# Patient Record
Sex: MALE | Race: WHITE | Employment: OTHER | URBAN - METROPOLITAN AREA
[De-identification: names, ages, dates, MRNs, and addresses within clinical notes are randomized per-mention and may not be internally consistent; named-entity substitution may affect disease eponyms.]

---

## 2022-06-28 ENCOUNTER — TELEPHONE (OUTPATIENT)
Dept: ORTHOPEDIC SURGERY | Age: 72
End: 2022-06-28

## 2022-06-28 DIAGNOSIS — Z01.818 PREOPERATIVE TESTING: Primary | ICD-10-CM

## 2022-06-28 DIAGNOSIS — M16.12 PRIMARY OSTEOARTHRITIS OF LEFT HIP: ICD-10-CM

## 2022-06-28 NOTE — TELEPHONE ENCOUNTER
Orthopedic Nurse Navigator Summary    Patient Name:  Xavier Wilson  Anticipated Date of Surgery:  07/06/22  Attended Pre-op Education Class:  Video sent to patient email  PCP: Clare De La Paz CNP  Date of PCP visit for H&P: 06/23/22  Is patient in a Pain Management program:  Review of Medical history reveals history of: HTN, Hyperlipidemia, GERD, Non rheumatic aortic insufficiency, Chronic LAFB, Hearing loss, Back fusion- L5-S1 last year    Critical Lab Values  - Hemoglobin (g/dL):  Date: 06/29/22 Value 14.4  - Hematocrit(%): Date: 06/29/22  Value 43.4  - HgbA1C:  Date: 06/29/22 Value 6.1  - Albumin:  Date: 06/29/22   Value 4.5  - BUN:  Date:  06/29/22 Value 19  - Creatinine:  Date: 06/29/22  Value 1.13     06/28/22- MRSA swab- negative. Swab was positive for Staph Aureus    Coronary Artery Disease/HTN/CHF history: Yes  Cardiologist: Karma Martins CNP  Cardiac clearance necessary:  Yes  Date of cardiac clearance appt: 06/15/22  Final Cardiac recommendations: On any anticoagulation:    Diabetes History:  No  Most recent HgbA1C:  Pulmonary:  COPD/Emphysema/Use of home oxygen: No  Alcohol use: Yes    BMI greater than 40 at time of scheduling: Additional medical concerns:   Additional recommendations for above concerns:  Attended Pre-Hab program:    Anticipated Discharge Disposition:  Home with OPT  Who will be with patient at home following discharge:  Daughter, wife  Equipment patient already has:  Butch Opal, cane, crutches  Bedroom on first or second floor:  Second- he plans on sleeping on first floor in recliner  Bathroom on first or second floor:  both  Weight bearing status:  wbat  Pre-op ambulatory status: painful ambulation  Number of entry steps:  3  Caregiver assistance:  Full time    Sondra Johnson RN  Date:  06/27/22

## 2022-06-30 RX ORDER — ESCITALOPRAM OXALATE 10 MG/1
TABLET ORAL
COMMUNITY
Start: 2022-04-12

## 2022-06-30 RX ORDER — ATORVASTATIN CALCIUM 10 MG/1
TABLET, FILM COATED ORAL
COMMUNITY
Start: 2022-03-31

## 2022-06-30 RX ORDER — VALACYCLOVIR HYDROCHLORIDE 1 G/1
2000 TABLET, FILM COATED ORAL
COMMUNITY

## 2022-06-30 RX ORDER — FLUTICASONE PROPIONATE 50 MCG
1 SPRAY, SUSPENSION (ML) NASAL
COMMUNITY

## 2022-06-30 RX ORDER — GABAPENTIN 300 MG/1
300 CAPSULE ORAL 3 TIMES DAILY
COMMUNITY
Start: 2022-06-14

## 2022-06-30 RX ORDER — MELOXICAM 15 MG/1
TABLET ORAL
COMMUNITY
Start: 2022-06-28

## 2022-06-30 RX ORDER — METHYLPREDNISOLONE 4 MG/1
TABLET ORAL
COMMUNITY
Start: 2022-06-13

## 2022-06-30 RX ORDER — TRIAMCINOLONE ACETONIDE 1 MG/G
CREAM TOPICAL AS NEEDED
COMMUNITY
Start: 2022-05-06

## 2022-06-30 RX ORDER — AMOXICILLIN AND CLAVULANATE POTASSIUM 875; 125 MG/1; MG/1
TABLET, FILM COATED ORAL
COMMUNITY
Start: 2022-06-09

## 2022-06-30 NOTE — PROGRESS NOTES
Place patient label inside box (if no patient label, complete below)  Name:  :  MR#:   Davida Pierre / PROCEDURE  1. I (we), Ritika Bartholomew (Patient Name) authorize DR Bhavesh Conley (Provider / Cecile Ospina) and/or such assistants as may be selected by him/her, to perform the following operation/procedure(s): LEFT TOTAL HIP ARTHROPLASTY ANTERIOR APPROACH        Note: If unable to obtain consent prior to an emergent procedure, document the emergent reason in the medical record. This procedure has been explained to my (our) satisfaction and included in the explanation was:  A) The intended benefit, nature, and extent of the procedure to be performed;  B) The significant risks involved and the probability of success;  C) Alternative procedures and methods of treatment;  D) The dangers and probable consequences of such alternatives (including no procedure or treatment); E) The expected consequences of the procedure on my future health;  F) Whether other qualified individuals would be performing important surgical tasks and/or whether  would be present to advise or support the procedure. I (we) understand that there are other risks of infection and other serious complications in the pre-operative/procedural and postoperative/procedural stages of my (our) care. I (we) have asked all of the questions which I (we) thought were important in deciding whether or not to undergo treatment or diagnosis. These questions have been answered to my (our) satisfaction. I (we) understand that no assurance can be given that the procedure will be a success, and no guarantee or warranty of success has been given to me (us).     2. It has been explained to me (us) that during the course of the operation/procedure, unforeseen conditions may be revealed that necessitate extension of the original procedure(s) or different procedure(s) than those set forth in Paragraph 1. I (we) authorize and request that the above-named physician, his/her assistants or his/her designees, perform procedures as necessary and desirable if deemed to be in my (our) best interest.     Revised 8/2/2021                                                                          Page 1 of 2       3. I acknowledge that health care personnel may be observing this procedure for the purpose of medical education or other specified purposes as may be necessary as requested and/or approved by my (our) physician. 4. I (we) consent to the disposal by the hospital Pathologist of the removed tissue, parts or organs in accordance with hospital policy. 5. I do ____ do not ____ consent to the use of a local infiltration pain blocking agent that will be used by my provider/surgical provider to help alleviate pain during my procedure. 6. I do ____ do not ____ consent to an emergent blood transfusion in the case of a life-threatening situation that requires blood components to be administered. This consent is valid for 24 hours from the beginning of the procedure. 7. This patient does ____ or does not ____ currently have a DNR status/order. If DNR order is in place, obtain Addendum to the Surgical Consent for ALL Patients with a DNR Order to address tara-operative status for limited intervention or DNR suspension.      8. I have read and fully understand the above Consent for Operation/Procedure and that all blanks were completed before I signed the consent.   _____________________________       _____________________      ____/____am/pm  Signature of Patient or legal representative      Printed Name / Relationship            Date / Time   ____________________________       _____________________      ____/____am/pm  Witness to Signature                                    Printed Name                    Date / Time     If patient is unable to sign or is a minor, complete the following)  Patient is a minor, ____ years of age, or unable to sign because:   ______________________________________________________________________________________________    Maggie Soto If a phone consent is obtained, consent will be documented by using two health care professionals, each affirming that the consenting party has no questions and gives consent for the procedure discussed with the physician/provider.   _____________________          ____________________       _____/_____am/pm   2nd witness to phone consent        Printed name           Date / Time    Informed Consent:  I have provided the explanation described above in section 1 to the patient and/or legal representative.  I have provided the patient and/or legal representative with an opportunity to ask any questions about the proposed operation/procedure.   ___________________________          ____________________         ____/____am/pm  Provider / Proceduralist                            Printed name            Date / Time  Revised 8/2/2021                                                                      Page 2 of 2

## 2022-07-05 ENCOUNTER — ANESTHESIA EVENT (OUTPATIENT)
Dept: OPERATING ROOM | Age: 72
End: 2022-07-05
Payer: MEDICARE

## 2022-07-06 ENCOUNTER — APPOINTMENT (OUTPATIENT)
Dept: GENERAL RADIOLOGY | Age: 72
End: 2022-07-06
Attending: ORTHOPAEDIC SURGERY
Payer: MEDICARE

## 2022-07-06 ENCOUNTER — HOSPITAL ENCOUNTER (OUTPATIENT)
Age: 72
Setting detail: OBSERVATION
Discharge: HOME | End: 2022-07-07
Attending: ORTHOPAEDIC SURGERY | Admitting: ORTHOPAEDIC SURGERY
Payer: MEDICARE

## 2022-07-06 ENCOUNTER — ANESTHESIA (OUTPATIENT)
Dept: OPERATING ROOM | Age: 72
End: 2022-07-06
Payer: MEDICARE

## 2022-07-06 DIAGNOSIS — M16.12 PRIMARY OSTEOARTHRITIS OF LEFT HIP: Primary | ICD-10-CM

## 2022-07-06 PROCEDURE — 2580000003 HC RX 258: Performed by: ORTHOPAEDIC SURGERY

## 2022-07-06 PROCEDURE — 7100000000 HC PACU RECOVERY - FIRST 15 MIN: Performed by: ORTHOPAEDIC SURGERY

## 2022-07-06 PROCEDURE — C1776 JOINT DEVICE (IMPLANTABLE): HCPCS | Performed by: ORTHOPAEDIC SURGERY

## 2022-07-06 PROCEDURE — 6360000002 HC RX W HCPCS: Performed by: NURSE ANESTHETIST, CERTIFIED REGISTERED

## 2022-07-06 PROCEDURE — 2580000003 HC RX 258: Performed by: ANESTHESIOLOGY

## 2022-07-06 PROCEDURE — 3209999900 FLUORO FOR SURGICAL PROCEDURES

## 2022-07-06 PROCEDURE — 3600000004 HC SURGERY LEVEL 4 BASE: Performed by: ORTHOPAEDIC SURGERY

## 2022-07-06 PROCEDURE — 2700000000 HC OXYGEN THERAPY PER DAY

## 2022-07-06 PROCEDURE — 6370000000 HC RX 637 (ALT 250 FOR IP): Performed by: ORTHOPAEDIC SURGERY

## 2022-07-06 PROCEDURE — 6360000002 HC RX W HCPCS: Performed by: ORTHOPAEDIC SURGERY

## 2022-07-06 PROCEDURE — 6360000002 HC RX W HCPCS: Performed by: FAMILY MEDICINE

## 2022-07-06 PROCEDURE — 97530 THERAPEUTIC ACTIVITIES: CPT

## 2022-07-06 PROCEDURE — 97535 SELF CARE MNGMENT TRAINING: CPT

## 2022-07-06 PROCEDURE — 97166 OT EVAL MOD COMPLEX 45 MIN: CPT

## 2022-07-06 PROCEDURE — 97116 GAIT TRAINING THERAPY: CPT

## 2022-07-06 PROCEDURE — 6370000000 HC RX 637 (ALT 250 FOR IP): Performed by: FAMILY MEDICINE

## 2022-07-06 PROCEDURE — 94761 N-INVAS EAR/PLS OXIMETRY MLT: CPT

## 2022-07-06 PROCEDURE — G0378 HOSPITAL OBSERVATION PER HR: HCPCS

## 2022-07-06 PROCEDURE — 3700000000 HC ANESTHESIA ATTENDED CARE: Performed by: ORTHOPAEDIC SURGERY

## 2022-07-06 PROCEDURE — 2500000003 HC RX 250 WO HCPCS: Performed by: NURSE ANESTHETIST, CERTIFIED REGISTERED

## 2022-07-06 PROCEDURE — 3600000014 HC SURGERY LEVEL 4 ADDTL 15MIN: Performed by: ORTHOPAEDIC SURGERY

## 2022-07-06 PROCEDURE — 7100000001 HC PACU RECOVERY - ADDTL 15 MIN: Performed by: ORTHOPAEDIC SURGERY

## 2022-07-06 PROCEDURE — 3700000001 HC ADD 15 MINUTES (ANESTHESIA): Performed by: ORTHOPAEDIC SURGERY

## 2022-07-06 PROCEDURE — 73501 X-RAY EXAM HIP UNI 1 VIEW: CPT

## 2022-07-06 PROCEDURE — 2720000010 HC SURG SUPPLY STERILE: Performed by: ORTHOPAEDIC SURGERY

## 2022-07-06 PROCEDURE — 2709999900 HC NON-CHARGEABLE SUPPLY: Performed by: ORTHOPAEDIC SURGERY

## 2022-07-06 PROCEDURE — 97163 PT EVAL HIGH COMPLEX 45 MIN: CPT

## 2022-07-06 PROCEDURE — 2580000003 HC RX 258: Performed by: NURSE ANESTHETIST, CERTIFIED REGISTERED

## 2022-07-06 PROCEDURE — 2500000003 HC RX 250 WO HCPCS: Performed by: ORTHOPAEDIC SURGERY

## 2022-07-06 PROCEDURE — 73502 X-RAY EXAM HIP UNI 2-3 VIEWS: CPT

## 2022-07-06 DEVICE — SHELL ACET SZ F DIA56MM 5 CLUS H TRITANIUM PRESSFIT PRI: Type: IMPLANTABLE DEVICE | Site: HIP | Status: FUNCTIONAL

## 2022-07-06 DEVICE — SCREW BNE L25MM DIA6.5MM HEX LO PROF TRIDENT II: Type: IMPLANTABLE DEVICE | Site: HIP | Status: FUNCTIONAL

## 2022-07-06 DEVICE — HIP H2 TOT ADV OTHER HD IMPL CAPPED SYNTHES: Type: IMPLANTABLE DEVICE | Site: HIP | Status: FUNCTIONAL

## 2022-07-06 DEVICE — HEAD FEM DIA36MM +0MM OFFSET HIP BIOLOX DELT CERAMIC TAPR: Type: IMPLANTABLE DEVICE | Site: HIP | Status: FUNCTIONAL

## 2022-07-06 DEVICE — INSERT ACET F 0 DEG 36 MM HIP X3 TRIDENT: Type: IMPLANTABLE DEVICE | Site: HIP | Status: FUNCTIONAL

## 2022-07-06 RX ORDER — LABETALOL HYDROCHLORIDE 5 MG/ML
10 INJECTION, SOLUTION INTRAVENOUS
Status: DISCONTINUED | OUTPATIENT
Start: 2022-07-06 | End: 2022-07-06 | Stop reason: HOSPADM

## 2022-07-06 RX ORDER — PROPOFOL 10 MG/ML
INJECTION, EMULSION INTRAVENOUS PRN
Status: DISCONTINUED | OUTPATIENT
Start: 2022-07-06 | End: 2022-07-06 | Stop reason: SDUPTHER

## 2022-07-06 RX ORDER — PHENYLEPHRINE HYDROCHLORIDE 10 MG/ML
INJECTION INTRAVENOUS PRN
Status: DISCONTINUED | OUTPATIENT
Start: 2022-07-06 | End: 2022-07-06 | Stop reason: SDUPTHER

## 2022-07-06 RX ORDER — ONDANSETRON 2 MG/ML
4 INJECTION INTRAMUSCULAR; INTRAVENOUS EVERY 6 HOURS PRN
Status: DISCONTINUED | OUTPATIENT
Start: 2022-07-06 | End: 2022-07-07 | Stop reason: HOSPADM

## 2022-07-06 RX ORDER — PREDNISONE 1 MG/1
5 TABLET ORAL DAILY
Qty: 14 TABLET | Refills: 0 | Status: SHIPPED | OUTPATIENT
Start: 2022-07-06 | End: 2022-07-20

## 2022-07-06 RX ORDER — ASPIRIN 81 MG/1
81 TABLET ORAL 2 TIMES DAILY
Qty: 60 TABLET | Refills: 0 | Status: SHIPPED | OUTPATIENT
Start: 2022-07-06

## 2022-07-06 RX ORDER — OXYCODONE HYDROCHLORIDE 5 MG/1
10 TABLET ORAL PRN
Status: COMPLETED | OUTPATIENT
Start: 2022-07-06 | End: 2022-07-06

## 2022-07-06 RX ORDER — LISINOPRIL 40 MG/1
40 TABLET ORAL DAILY
Status: DISCONTINUED | OUTPATIENT
Start: 2022-07-07 | End: 2022-07-07 | Stop reason: HOSPADM

## 2022-07-06 RX ORDER — VANCOMYCIN HYDROCHLORIDE 1 G/20ML
INJECTION, POWDER, LYOPHILIZED, FOR SOLUTION INTRAVENOUS PRN
Status: DISCONTINUED | OUTPATIENT
Start: 2022-07-06 | End: 2022-07-06 | Stop reason: HOSPADM

## 2022-07-06 RX ORDER — OXYCODONE HYDROCHLORIDE 5 MG/1
5 TABLET ORAL EVERY 6 HOURS PRN
Qty: 28 TABLET | Refills: 0 | Status: SHIPPED | OUTPATIENT
Start: 2022-07-06 | End: 2022-07-13

## 2022-07-06 RX ORDER — ONDANSETRON 4 MG/1
4 TABLET, ORALLY DISINTEGRATING ORAL EVERY 8 HOURS PRN
Status: DISCONTINUED | OUTPATIENT
Start: 2022-07-06 | End: 2022-07-07 | Stop reason: HOSPADM

## 2022-07-06 RX ORDER — ESCITALOPRAM OXALATE 10 MG/1
10 TABLET ORAL DAILY
Status: DISCONTINUED | OUTPATIENT
Start: 2022-07-06 | End: 2022-07-07 | Stop reason: HOSPADM

## 2022-07-06 RX ORDER — MELOXICAM 7.5 MG/1
3.75 TABLET ORAL DAILY PRN
Status: DISCONTINUED | OUTPATIENT
Start: 2022-07-06 | End: 2022-07-07 | Stop reason: HOSPADM

## 2022-07-06 RX ORDER — OXYCODONE HYDROCHLORIDE 5 MG/1
5 TABLET ORAL
Status: DISCONTINUED | OUTPATIENT
Start: 2022-07-06 | End: 2022-07-07 | Stop reason: HOSPADM

## 2022-07-06 RX ORDER — ACETAMINOPHEN 500 MG
1000 TABLET ORAL ONCE
Status: COMPLETED | OUTPATIENT
Start: 2022-07-06 | End: 2022-07-06

## 2022-07-06 RX ORDER — SODIUM CHLORIDE 9 MG/ML
25 INJECTION, SOLUTION INTRAVENOUS PRN
Status: DISCONTINUED | OUTPATIENT
Start: 2022-07-06 | End: 2022-07-06 | Stop reason: HOSPADM

## 2022-07-06 RX ORDER — SODIUM CHLORIDE, SODIUM LACTATE, POTASSIUM CHLORIDE, CALCIUM CHLORIDE 600; 310; 30; 20 MG/100ML; MG/100ML; MG/100ML; MG/100ML
INJECTION, SOLUTION INTRAVENOUS CONTINUOUS
Status: DISCONTINUED | OUTPATIENT
Start: 2022-07-06 | End: 2022-07-06 | Stop reason: HOSPADM

## 2022-07-06 RX ORDER — SODIUM CHLORIDE 0.9 % (FLUSH) 0.9 %
5-40 SYRINGE (ML) INJECTION EVERY 12 HOURS SCHEDULED
Status: DISCONTINUED | OUTPATIENT
Start: 2022-07-06 | End: 2022-07-06 | Stop reason: HOSPADM

## 2022-07-06 RX ORDER — NALBUPHINE HCL 10 MG/ML
5 AMPUL (ML) INJECTION EVERY 5 MIN PRN
Status: DISCONTINUED | OUTPATIENT
Start: 2022-07-06 | End: 2022-07-07 | Stop reason: HOSPADM

## 2022-07-06 RX ORDER — DEXAMETHASONE SODIUM PHOSPHATE 4 MG/ML
4 INJECTION, SOLUTION INTRA-ARTICULAR; INTRALESIONAL; INTRAMUSCULAR; INTRAVENOUS; SOFT TISSUE EVERY 12 HOURS
Status: COMPLETED | OUTPATIENT
Start: 2022-07-06 | End: 2022-07-07

## 2022-07-06 RX ORDER — POLYETHYLENE GLYCOL 3350 17 G/17G
17 POWDER, FOR SOLUTION ORAL DAILY PRN
Status: DISCONTINUED | OUTPATIENT
Start: 2022-07-06 | End: 2022-07-07 | Stop reason: HOSPADM

## 2022-07-06 RX ORDER — OXYCODONE HYDROCHLORIDE 5 MG/1
2.5 TABLET ORAL
Status: DISCONTINUED | OUTPATIENT
Start: 2022-07-06 | End: 2022-07-07 | Stop reason: HOSPADM

## 2022-07-06 RX ORDER — ROCURONIUM BROMIDE 10 MG/ML
INJECTION, SOLUTION INTRAVENOUS PRN
Status: DISCONTINUED | OUTPATIENT
Start: 2022-07-06 | End: 2022-07-06 | Stop reason: SDUPTHER

## 2022-07-06 RX ORDER — SODIUM CHLORIDE, SODIUM LACTATE, POTASSIUM CHLORIDE, CALCIUM CHLORIDE 600; 310; 30; 20 MG/100ML; MG/100ML; MG/100ML; MG/100ML
INJECTION, SOLUTION INTRAVENOUS CONTINUOUS
Status: DISCONTINUED | OUTPATIENT
Start: 2022-07-06 | End: 2022-07-07 | Stop reason: HOSPADM

## 2022-07-06 RX ORDER — LIDOCAINE HYDROCHLORIDE 20 MG/ML
INJECTION, SOLUTION INTRAVENOUS PRN
Status: DISCONTINUED | OUTPATIENT
Start: 2022-07-06 | End: 2022-07-06 | Stop reason: SDUPTHER

## 2022-07-06 RX ORDER — ATENOLOL 50 MG/1
100 TABLET ORAL DAILY
Status: DISCONTINUED | OUTPATIENT
Start: 2022-07-07 | End: 2022-07-07 | Stop reason: HOSPADM

## 2022-07-06 RX ORDER — FENTANYL CITRATE 50 UG/ML
25 INJECTION, SOLUTION INTRAMUSCULAR; INTRAVENOUS EVERY 5 MIN PRN
Status: DISCONTINUED | OUTPATIENT
Start: 2022-07-06 | End: 2022-07-06 | Stop reason: HOSPADM

## 2022-07-06 RX ORDER — ATORVASTATIN CALCIUM 10 MG/1
10 TABLET, FILM COATED ORAL DAILY
Status: DISCONTINUED | OUTPATIENT
Start: 2022-07-06 | End: 2022-07-07 | Stop reason: HOSPADM

## 2022-07-06 RX ORDER — HYDROMORPHONE HCL 110MG/55ML
PATIENT CONTROLLED ANALGESIA SYRINGE INTRAVENOUS PRN
Status: DISCONTINUED | OUTPATIENT
Start: 2022-07-06 | End: 2022-07-06 | Stop reason: SDUPTHER

## 2022-07-06 RX ORDER — GLYCOPYRROLATE 0.2 MG/ML
INJECTION INTRAMUSCULAR; INTRAVENOUS PRN
Status: DISCONTINUED | OUTPATIENT
Start: 2022-07-06 | End: 2022-07-06 | Stop reason: SDUPTHER

## 2022-07-06 RX ORDER — SODIUM CHLORIDE 0.9 % (FLUSH) 0.9 %
5-40 SYRINGE (ML) INJECTION PRN
Status: DISCONTINUED | OUTPATIENT
Start: 2022-07-06 | End: 2022-07-06 | Stop reason: HOSPADM

## 2022-07-06 RX ORDER — SENNA AND DOCUSATE SODIUM 50; 8.6 MG/1; MG/1
1 TABLET, FILM COATED ORAL 2 TIMES DAILY
Status: DISCONTINUED | OUTPATIENT
Start: 2022-07-06 | End: 2022-07-07 | Stop reason: HOSPADM

## 2022-07-06 RX ORDER — GABAPENTIN 300 MG/1
300 CAPSULE ORAL 3 TIMES DAILY
Status: DISCONTINUED | OUTPATIENT
Start: 2022-07-06 | End: 2022-07-07 | Stop reason: HOSPADM

## 2022-07-06 RX ORDER — AMLODIPINE BESYLATE 10 MG/1
10 TABLET ORAL DAILY
Status: DISCONTINUED | OUTPATIENT
Start: 2022-07-07 | End: 2022-07-07 | Stop reason: HOSPADM

## 2022-07-06 RX ORDER — OXYCODONE HYDROCHLORIDE 5 MG/1
5 TABLET ORAL PRN
Status: COMPLETED | OUTPATIENT
Start: 2022-07-06 | End: 2022-07-06

## 2022-07-06 RX ORDER — SODIUM CHLORIDE 0.9 % (FLUSH) 0.9 %
5-40 SYRINGE (ML) INJECTION EVERY 12 HOURS SCHEDULED
Status: DISCONTINUED | OUTPATIENT
Start: 2022-07-06 | End: 2022-07-07 | Stop reason: HOSPADM

## 2022-07-06 RX ORDER — AMOXICILLIN 250 MG
2 CAPSULE ORAL DAILY PRN
Qty: 30 TABLET | Refills: 2 | Status: SHIPPED | OUTPATIENT
Start: 2022-07-06

## 2022-07-06 RX ORDER — ENOXAPARIN SODIUM 100 MG/ML
40 INJECTION SUBCUTANEOUS DAILY
Status: DISCONTINUED | OUTPATIENT
Start: 2022-07-07 | End: 2022-07-07 | Stop reason: HOSPADM

## 2022-07-06 RX ORDER — APREPITANT 40 MG/1
40 CAPSULE ORAL ONCE
Status: COMPLETED | OUTPATIENT
Start: 2022-07-06 | End: 2022-07-06

## 2022-07-06 RX ORDER — FENTANYL CITRATE 50 UG/ML
INJECTION, SOLUTION INTRAMUSCULAR; INTRAVENOUS PRN
Status: DISCONTINUED | OUTPATIENT
Start: 2022-07-06 | End: 2022-07-06 | Stop reason: SDUPTHER

## 2022-07-06 RX ORDER — PROCHLORPERAZINE EDISYLATE 5 MG/ML
5 INJECTION INTRAMUSCULAR; INTRAVENOUS
Status: DISCONTINUED | OUTPATIENT
Start: 2022-07-06 | End: 2022-07-06 | Stop reason: HOSPADM

## 2022-07-06 RX ORDER — CELECOXIB 200 MG/1
400 CAPSULE ORAL ONCE
Status: COMPLETED | OUTPATIENT
Start: 2022-07-06 | End: 2022-07-06

## 2022-07-06 RX ORDER — SODIUM CHLORIDE, SODIUM LACTATE, POTASSIUM CHLORIDE, CALCIUM CHLORIDE 600; 310; 30; 20 MG/100ML; MG/100ML; MG/100ML; MG/100ML
INJECTION, SOLUTION INTRAVENOUS CONTINUOUS PRN
Status: DISCONTINUED | OUTPATIENT
Start: 2022-07-06 | End: 2022-07-06 | Stop reason: SDUPTHER

## 2022-07-06 RX ORDER — SCOLOPAMINE TRANSDERMAL SYSTEM 1 MG/1
1 PATCH, EXTENDED RELEASE TRANSDERMAL ONCE
Status: DISCONTINUED | OUTPATIENT
Start: 2022-07-06 | End: 2022-07-07 | Stop reason: HOSPADM

## 2022-07-06 RX ORDER — DIPHENHYDRAMINE HYDROCHLORIDE 50 MG/ML
12.5 INJECTION INTRAMUSCULAR; INTRAVENOUS
Status: DISCONTINUED | OUTPATIENT
Start: 2022-07-06 | End: 2022-07-06 | Stop reason: HOSPADM

## 2022-07-06 RX ORDER — LORAZEPAM 2 MG/ML
0.5 INJECTION INTRAMUSCULAR
Status: DISCONTINUED | OUTPATIENT
Start: 2022-07-06 | End: 2022-07-06 | Stop reason: HOSPADM

## 2022-07-06 RX ORDER — MEPERIDINE HYDROCHLORIDE 25 MG/ML
12.5 INJECTION INTRAMUSCULAR; INTRAVENOUS; SUBCUTANEOUS EVERY 5 MIN PRN
Status: DISCONTINUED | OUTPATIENT
Start: 2022-07-06 | End: 2022-07-06 | Stop reason: HOSPADM

## 2022-07-06 RX ORDER — MELOXICAM 7.5 MG/1
3.75 TABLET ORAL DAILY
Status: DISCONTINUED | OUTPATIENT
Start: 2022-07-06 | End: 2022-07-07 | Stop reason: HOSPADM

## 2022-07-06 RX ORDER — SODIUM CHLORIDE 9 MG/ML
INJECTION, SOLUTION INTRAVENOUS PRN
Status: DISCONTINUED | OUTPATIENT
Start: 2022-07-06 | End: 2022-07-07 | Stop reason: HOSPADM

## 2022-07-06 RX ORDER — GABAPENTIN 300 MG/1
300 CAPSULE ORAL ONCE
Status: COMPLETED | OUTPATIENT
Start: 2022-07-06 | End: 2022-07-06

## 2022-07-06 RX ORDER — SODIUM CHLORIDE 9 MG/ML
INJECTION, SOLUTION INTRAVENOUS CONTINUOUS
Status: DISCONTINUED | OUTPATIENT
Start: 2022-07-06 | End: 2022-07-06

## 2022-07-06 RX ORDER — DEXAMETHASONE SODIUM PHOSPHATE 10 MG/ML
10 INJECTION, SOLUTION INTRAMUSCULAR; INTRAVENOUS ONCE
Status: COMPLETED | OUTPATIENT
Start: 2022-07-06 | End: 2022-07-06

## 2022-07-06 RX ORDER — ONDANSETRON 2 MG/ML
4 INJECTION INTRAMUSCULAR; INTRAVENOUS
Status: DISCONTINUED | OUTPATIENT
Start: 2022-07-06 | End: 2022-07-06 | Stop reason: HOSPADM

## 2022-07-06 RX ORDER — ACETAMINOPHEN 500 MG
1000 TABLET ORAL EVERY 8 HOURS SCHEDULED
Status: DISCONTINUED | OUTPATIENT
Start: 2022-07-06 | End: 2022-07-07 | Stop reason: HOSPADM

## 2022-07-06 RX ORDER — SODIUM CHLORIDE 0.9 % (FLUSH) 0.9 %
5-40 SYRINGE (ML) INJECTION PRN
Status: DISCONTINUED | OUTPATIENT
Start: 2022-07-06 | End: 2022-07-07 | Stop reason: HOSPADM

## 2022-07-06 RX ADMIN — ATORVASTATIN CALCIUM 10 MG: 10 TABLET, FILM COATED ORAL at 18:01

## 2022-07-06 RX ADMIN — PHENYLEPHRINE HYDROCHLORIDE 100 MCG: 10 INJECTION INTRAVENOUS at 08:17

## 2022-07-06 RX ADMIN — FENTANYL CITRATE 50 MCG: 50 INJECTION, SOLUTION INTRAMUSCULAR; INTRAVENOUS at 07:28

## 2022-07-06 RX ADMIN — PHENYLEPHRINE HYDROCHLORIDE 100 MCG: 10 INJECTION INTRAVENOUS at 08:53

## 2022-07-06 RX ADMIN — SODIUM CHLORIDE, PRESERVATIVE FREE 10 ML: 5 INJECTION INTRAVENOUS at 20:31

## 2022-07-06 RX ADMIN — DEXAMETHASONE SODIUM PHOSPHATE 4 MG: 4 INJECTION, SOLUTION INTRAMUSCULAR; INTRAVENOUS at 11:04

## 2022-07-06 RX ADMIN — FENTANYL CITRATE 50 MCG: 50 INJECTION, SOLUTION INTRAMUSCULAR; INTRAVENOUS at 07:51

## 2022-07-06 RX ADMIN — FENTANYL CITRATE 50 MCG: 50 INJECTION, SOLUTION INTRAMUSCULAR; INTRAVENOUS at 07:57

## 2022-07-06 RX ADMIN — SUGAMMADEX 200 MG: 100 INJECTION, SOLUTION INTRAVENOUS at 09:31

## 2022-07-06 RX ADMIN — MELOXICAM 3.75 MG: 7.5 TABLET ORAL at 18:02

## 2022-07-06 RX ADMIN — SODIUM CHLORIDE, POTASSIUM CHLORIDE, SODIUM LACTATE AND CALCIUM CHLORIDE: 600; 310; 30; 20 INJECTION, SOLUTION INTRAVENOUS at 18:07

## 2022-07-06 RX ADMIN — CEFAZOLIN 2000 MG: 2 INJECTION, POWDER, FOR SOLUTION INTRAMUSCULAR; INTRAVENOUS at 18:12

## 2022-07-06 RX ADMIN — ACETAMINOPHEN 1000 MG: 500 TABLET ORAL at 06:38

## 2022-07-06 RX ADMIN — OXYCODONE 5 MG: 5 TABLET ORAL at 11:02

## 2022-07-06 RX ADMIN — PHENYLEPHRINE HYDROCHLORIDE 100 MCG: 10 INJECTION INTRAVENOUS at 09:09

## 2022-07-06 RX ADMIN — SODIUM CHLORIDE, POTASSIUM CHLORIDE, SODIUM LACTATE AND CALCIUM CHLORIDE: 600; 310; 30; 20 INJECTION, SOLUTION INTRAVENOUS at 06:36

## 2022-07-06 RX ADMIN — CELECOXIB 400 MG: 200 CAPSULE ORAL at 06:39

## 2022-07-06 RX ADMIN — GABAPENTIN 300 MG: 300 CAPSULE ORAL at 18:02

## 2022-07-06 RX ADMIN — OXYCODONE 5 MG: 5 TABLET ORAL at 18:01

## 2022-07-06 RX ADMIN — GLYCOPYRROLATE 0.2 MG: 0.2 INJECTION INTRAMUSCULAR; INTRAVENOUS at 08:16

## 2022-07-06 RX ADMIN — ACETAMINOPHEN 1000 MG: 500 TABLET ORAL at 20:28

## 2022-07-06 RX ADMIN — PHENYLEPHRINE HYDROCHLORIDE 100 MCG: 10 INJECTION INTRAVENOUS at 09:01

## 2022-07-06 RX ADMIN — GABAPENTIN 300 MG: 300 CAPSULE ORAL at 06:39

## 2022-07-06 RX ADMIN — PHENYLEPHRINE HYDROCHLORIDE 100 MCG: 10 INJECTION INTRAVENOUS at 08:43

## 2022-07-06 RX ADMIN — FENTANYL CITRATE 50 MCG: 50 INJECTION, SOLUTION INTRAMUSCULAR; INTRAVENOUS at 07:55

## 2022-07-06 RX ADMIN — PHENYLEPHRINE HYDROCHLORIDE 100 MCG: 10 INJECTION INTRAVENOUS at 08:31

## 2022-07-06 RX ADMIN — PHENYLEPHRINE HYDROCHLORIDE 100 MCG: 10 INJECTION INTRAVENOUS at 09:15

## 2022-07-06 RX ADMIN — HYDROMORPHONE HYDROCHLORIDE 1 MG: 2 INJECTION, SOLUTION INTRAMUSCULAR; INTRAVENOUS; SUBCUTANEOUS at 09:46

## 2022-07-06 RX ADMIN — DEXAMETHASONE SODIUM PHOSPHATE 10 MG: 10 INJECTION, SOLUTION INTRAMUSCULAR; INTRAVENOUS at 06:41

## 2022-07-06 RX ADMIN — PROPOFOL 150 MG: 10 INJECTION, EMULSION INTRAVENOUS at 07:28

## 2022-07-06 RX ADMIN — HYDROMORPHONE HYDROCHLORIDE 1 MG: 2 INJECTION, SOLUTION INTRAMUSCULAR; INTRAVENOUS; SUBCUTANEOUS at 09:39

## 2022-07-06 RX ADMIN — SODIUM CHLORIDE, SODIUM LACTATE, POTASSIUM CHLORIDE, AND CALCIUM CHLORIDE: .6; .31; .03; .02 INJECTION, SOLUTION INTRAVENOUS at 07:23

## 2022-07-06 RX ADMIN — TRANEXAMIC ACID 1000 MG: 1 INJECTION, SOLUTION INTRAVENOUS at 07:40

## 2022-07-06 RX ADMIN — HYDROMORPHONE HYDROCHLORIDE 0.5 MG: 1 INJECTION, SOLUTION INTRAMUSCULAR; INTRAVENOUS; SUBCUTANEOUS at 10:25

## 2022-07-06 RX ADMIN — CEFAZOLIN 2000 MG: 2 INJECTION, POWDER, FOR SOLUTION INTRAMUSCULAR; INTRAVENOUS at 07:44

## 2022-07-06 RX ADMIN — GABAPENTIN 300 MG: 300 CAPSULE ORAL at 20:28

## 2022-07-06 RX ADMIN — LIDOCAINE HYDROCHLORIDE 50 MG: 20 INJECTION, SOLUTION INTRAVENOUS at 07:28

## 2022-07-06 RX ADMIN — SENNOSIDES AND DOCUSATE SODIUM 1 TABLET: 50; 8.6 TABLET ORAL at 20:28

## 2022-07-06 RX ADMIN — APREPITANT 40 MG: 40 CAPSULE ORAL at 07:02

## 2022-07-06 RX ADMIN — ROCURONIUM BROMIDE 50 MG: 10 INJECTION INTRAVENOUS at 07:28

## 2022-07-06 RX ADMIN — ESCITALOPRAM OXALATE 10 MG: 10 TABLET ORAL at 18:02

## 2022-07-06 RX ADMIN — ACETAMINOPHEN 1000 MG: 500 TABLET ORAL at 18:02

## 2022-07-06 ASSESSMENT — PAIN SCALES - GENERAL
PAINLEVEL_OUTOF10: 5
PAINLEVEL_OUTOF10: 3
PAINLEVEL_OUTOF10: 7
PAINLEVEL_OUTOF10: 9
PAINLEVEL_OUTOF10: 7
PAINLEVEL_OUTOF10: 4
PAINLEVEL_OUTOF10: 2

## 2022-07-06 ASSESSMENT — PAIN DESCRIPTION - FREQUENCY
FREQUENCY: CONTINUOUS

## 2022-07-06 ASSESSMENT — PAIN DESCRIPTION - ORIENTATION
ORIENTATION: LEFT

## 2022-07-06 ASSESSMENT — PAIN DESCRIPTION - DESCRIPTORS
DESCRIPTORS: SHARP;SPASM
DESCRIPTORS: ACHING
DESCRIPTORS: SHARP
DESCRIPTORS: ACHING;SPASM
DESCRIPTORS: ACHING;BURNING
DESCRIPTORS: ACHING
DESCRIPTORS: ACHING;SPASM;SORE

## 2022-07-06 ASSESSMENT — PAIN DESCRIPTION - PAIN TYPE
TYPE: SURGICAL PAIN

## 2022-07-06 ASSESSMENT — PAIN DESCRIPTION - LOCATION
LOCATION: HIP

## 2022-07-06 ASSESSMENT — PAIN - FUNCTIONAL ASSESSMENT: PAIN_FUNCTIONAL_ASSESSMENT: 0-10

## 2022-07-06 NOTE — PROGRESS NOTES
PACU Transfer to Floor Note    Procedure(s):  LEFT TOTAL HIP ARTHROPLASTY ANTERIOR APPROACH    Current Allergies: Triple antibiotic [bacitracin-neomycin-polymyxin]    Pt meets criteria as per Clemente Score and ASPAN Standards to transfer to next phase of care. No results for input(s): POCGLU in the last 72 hours. Vitals:    07/06/22 1500   BP: 114/72   Pulse: 64   Resp: 20   Temp: 98.4 °F (36.9 °C)   SpO2: 97%         SpO2: 97 %    O2 Flow Rate (L/min): 2 L/min      Intake/Output Summary (Last 24 hours) at 7/6/2022 1521  Last data filed at 7/6/2022 1445  Gross per 24 hour   Intake 1900 ml   Output 300 ml   Net 1600 ml       Pain assessment:  present - adequately treated    Pain Level: 2    Patient was assessed for alterations to skin integrity. There were not alterations observed. Is patient incontinent: no    Handoff report given at bedside.    Family updated and directed to pt room      7/6/2022 3:19 PM

## 2022-07-06 NOTE — PROGRESS NOTES
Called into waiting room. Volunteer located patient's wife. I updated her to pt status. Wife stated she was going to take a walk.

## 2022-07-06 NOTE — PROGRESS NOTES
Procedure(s):  LEFT TOTAL HIP ARTHROPLASTY ANTERIOR APPROACH    Current Allergies: Triple antibiotic [bacitracin-neomycin-polymyxin]    No results for input(s): POCGLU in the last 72 hours. Admitted to PACU bed 4 from OR. Arrived on a stretcher . Attached to PACU monitoring system. Alarms and parameters set. Report received from anesthesia personnel. OR staff did not report skin issues that were observed while in OR  Pt arrived with oxygen per nasal cannula with oxygen at 3 liters. Athrombic wraps in place. Awake and alert. Complaining of severe left hip pain. 9/10- sharp. Medicated by Raf Rodas for pain.

## 2022-07-06 NOTE — PROGRESS NOTES
Pt stated he worries about his wife as she is in the early stages of dementia. Stated his own anxiety has been increasing as his wife changes.

## 2022-07-06 NOTE — ANESTHESIA POSTPROCEDURE EVALUATION
Department of Anesthesiology  Postprocedure Note    Patient: Kai Peter  MRN: [id number]  YOB: 1950  Date of evaluation: 7/6/2022      Procedure Summary     Date: 07/06/22 Room / Location: [facility] [address]  / [facility]    Anesthesia Start: 5557 Anesthesia Stop: 1745    Procedure: LEFT TOTAL HIP ARTHROPLASTY ANTERIOR APPROACH (Left ) Diagnosis:       Primary osteoarthritis of left hip      (Primary osteoarthritis of left hip [M16.12])    Surgeons: Roxanne Zavala MD Responsible Provider: Eliceo Sinclair MD    Anesthesia Type: general ASA Status: 2          Anesthesia Type: No value filed.     Clemente Phase I: Clmeente Score: 9    Clemente Phase II:        Anesthesia Post Evaluation    Patient location during evaluation: PACU  Level of consciousness: awake  Complications: no  Multimodal analgesia pain management approach

## 2022-07-06 NOTE — PROGRESS NOTES
Physical Therapy  Facility/Department: [facility] GENERAL SURGERY  Physical Therapy Initial Assessment / treatment    Name: Santy Alcaraz  : 1950  MRN: [id number]  Date of Service: 2022    Discharge Recommendations: Santy Alcaraz scored a 17/24 on the AM-PAC short mobility form. Current research shows that an AM-PAC score of 18 or greater is typically associated with a discharge to the patient's home setting. Based on the patient's AM-PAC score and their current functional mobility deficits, it is recommended that the patient have 2-3 sessions per week of Physical Therapy at d/c to increase the patient's independence. At this time, this patient demonstrates the endurance and safety to discharge home with home services and a follow up treatment frequency of 2-3x/wk. Please see assessment section for further patient specific details. If patient discharges prior to next session this note will serve as a discharge summary. Please see below for the latest assessment towards goals. PT Equipment Recommendations  Equipment Needed: No  Other: pt owns RW      Patient Diagnosis(es): The encounter diagnosis was Primary osteoarthritis of left hip. Past Medical History:  has a past medical history of Anxiety, Arthritis, Depression, Foot pain, right, Hx of degenerative disc disease, Hyperlipidemia, Hypertension, and PONV (postoperative nausea and vomiting). Past Surgical History:  has a past surgical history that includes hernia repair; Nose surgery; Shoulder Arthroplasty; Colonoscopy (3/27/2015); Hip Arthroplasty (Right); lumbar fusion; joint replacement (Bilateral); and Vasectomy. Assessment   Body Structures, Functions, Activity Limitations Requiring Skilled Therapeutic Intervention: Decreased functional mobility   Assessment: Pt is 80 y.o. male POD #0 s/p L anterior DARON. Pt is below his functional baseline. Demos fair tolerance of therapy today. Diaphoresis noted with amb.   Pt reports feeling Independent  Ambulation Assistance: Independent (with cane prn in the house)  Transfer Assistance: Independent  Active : Yes  Occupation: Retired  Vision/Hearing  Vision  Vision: Impaired  Vision Exceptions: Wears glasses at all times  Hearing  Hearing: Within functional limits    Cognition   Orientation  Overall Orientation Status: Within Normal Limits  Cognition  Overall Cognitive Status: Exceptions  Arousal/Alertness: Appropriate responses to stimuli  Following Commands: Follows one step commands with repetition  Attention Span: Difficulty dividing attention  Memory: Appears intact  Sequencing: Requires cues for some     Objective               AROM RLE (degrees)  RLE AROM: WFL  AROM LLE (degrees)  LLE AROM : WFL  Strength RLE  Strength RLE: WFL  Strength LLE  Comment: 3-/5 hip flex; difficulty moving LE in/out of bed           Bed mobility  Supine to Sit: Stand by assistance (HOB elevated)  Sit to Supine: Contact guard assistance  Scooting: Stand by assistance  Transfers  Sit to Stand: Contact guard assistance  Stand to sit: Contact guard assistance  Comment: cues for hand placement with transfers  Ambulation  Device: Rolling Walker  Assistance: Minimal assistance (to CGA)  Quality of Gait: max cues for LE sequencing, discontinuous steps, effortful  Gait Deviations: Slow Lily;Decreased step length;Decreased step height  Distance: 20 ft x 2  Comments: amb distance limited 2* pt diaphoretic and reports not feeling well during 2nd amb trial     Balance  Sitting - Static: Good  Sitting - Dynamic: Good  Standing - Static: Fair  Standing - Dynamic: Poor (CGA to min A)       post amb, BP: 94/67 - RN aware.     OutComes Score                                                  AM-PAC Score  AM-PAC Inpatient Mobility Raw Score : 17 (07/06/22 1312)  AM-PAC Inpatient T-Scale Score : 42.13 (07/06/22 1312)  Mobility Inpatient CMS 0-100% Score: 50.57 (07/06/22 1312)  Mobility Inpatient CMS G-Code Modifier : CK (07/06/22 1312)          Goals  Short Term Goals  Time Frame for Short term goals: discharge  Short term goal 1: Pt will transfer supine <--> sit with supervision  Short term goal 2: Pt will transfer sit <--> stand with supervision  Short term goal 3: Pt will amb 150 ft with RW and SBA  Short term goal 4: Pt will negotiate 2 steps with CGA  Short term goal 5: Pt will demo good understanding of anterior hip precautions  Patient Goals   Patient goals : return home       Education  Patient Education  Education Given To: Patient  Education Provided: Role of Therapy;Precautions  Education Provided Comments: anterior hip precautions, WBAT  Education Method: Demonstration;Verbal  Barriers to Learning: Other (Comment)  Education Outcome: Verbalized understanding;Continued education needed      Therapy Time   Individual Concurrent Group Co-treatment   Time In 1200         Time Out 1253         Minutes 53                 Timed Code Treatment Minutes:  38    Total Treatment Minutes:  53    If patient is discharged prior to next treatment, this note will serve as the discharge summary.   Tess Amezcua, PT, DPT  [id number]

## 2022-07-06 NOTE — PROGRESS NOTES
Occupational Therapy  Facility/Department: [facility] 5T ORTHO/NEURO  Occupational Therapy Initial Assessment/Treatment    Name: Duc Haddad  : 1950  MRN: [id number]  Date of Service: 2022    Discharge Recommendations:    Duc Haddad scored a 20/24 on the AM-PAC ADL Inpatient form. Current research shows that an AM-PAC score of 18 or greater is typically associated with a discharge to the patient's home setting. Based on the patient's AM-PAC score, and their current ADL deficits, it is recommended that the patient have 2-3 sessions per week of Occupational Therapy at d/c to increase the patient's independence. At this time, this patient demonstrates the endurance and safety to discharge home with home services and a follow up treatment frequency of 2-3x/wk. Please see assessment section for further patient specific details. If patient discharges prior to next session this note will serve as a discharge summary. Please see below for the latest assessment towards goals. OT Equipment Recommendations  Equipment Needed: Yes  Other: sock aid       Patient Diagnosis(es): The encounter diagnosis was Primary osteoarthritis of left hip. Past Medical History:  has a past medical history of Anxiety, Arthritis, Depression, Foot pain, right, Hx of degenerative disc disease, Hyperlipidemia, Hypertension, and PONV (postoperative nausea and vomiting). Past Surgical History:  has a past surgical history that includes hernia repair; Nose surgery; Shoulder Arthroplasty; Colonoscopy (3/27/2015); Hip Arthroplasty (Right); lumbar fusion; joint replacement (Bilateral); and Vasectomy. Treatment Diagnosis: Impaired ADLs, mobility s/p L DARON      Assessment   Performance deficits / Impairments: Decreased functional mobility ; Decreased ADL status  Assessment: Pt evaluated POD #0 following L DARON.  Pt with symptomatic hypotension with ambulation and required significant cuing for sequencing and safety with short distance ambulation with walker. Required assist for safety with toileting/dressing ADLs. Poor carryover of hip precautions. Pt would benefit from additional OT tx prior to return home. Recommend initial 24hr assist at d/c. Treatment Diagnosis: Impaired ADLs, mobility s/p L DARON  Prognosis: Good  Decision Making: Medium Complexity  REQUIRES OT FOLLOW-UP: Yes  Activity Tolerance  Activity Tolerance: Patient limited by fatigue  Activity Tolerance Comments: hypotension- see below        Plan   Plan  Times per Week: 7     Restrictions  Position Activity Restriction  Other position/activity restrictions: FWB, activity as tolerated, anterior precautions (per verbal discussion with RN)    Subjective   General  Chart Reviewed: Yes  Additional Pertinent Hx: 80 y.o. male to OR 7/6 for L DARON; PMHx: anxiety, arthritis, HTN, R DARON, B TKR, lumbar fusion, B shoulder surgeries  Family / Caregiver Present: No  Referring Practitioner: Dr. Ty Tellez  Diagnosis: hip OA  Subjective  Subjective: Pt in PACU bed, agreeable to therapy evaluation and hopeful to d/c home today.  Denied pain     Social/Functional History  Social/Functional History  Lives With: Spouse  Type of Home: House  Home Layout: Able to Live on Main level with bedroom/bathroom,Bed/Bath upstairs  Home Access: Stairs to enter without rails  Entrance Stairs - Number of Steps: 3 MAGGIE  Bathroom Shower/Tub: Walk-in shower  Bathroom Toilet: Standard (sink nearby for leverage)  Bathroom Equipment: Shower chair (none)  Home Equipment: Randell Chambers  ADL Assistance: Independent  Homemaking Assistance: Independent  Ambulation Assistance: Independent (with cane prn in the house)  Transfer Assistance: Independent  Active : Yes  Occupation: Retired       Objective   Vision: wears glasses at all times  Hearing: [facility]    Safety Devices  Type of Devices:  (pt left supine on stretcher as he was found with RN nearby)        Toilet Transfers  Toilet Transfers Comments: deferred UE Function  AROM: Within functional limits  Strength: Within functional limits     ADL  Feeding: Independent (drink from cup)  Grooming: Contact guard assistance (stance at sink to wash hands)  LE Dressing: Minimal assistance (don underwear w/ assist thread L LE (would benefit from trialing AE next session- has reacher at home))  Toileting: Contact guard assistance (urinated and clothing mgmt in stance at toilet)  Additional Comments: Pt became diaphoretic and reported feeling dizzy after toileting/hand washing in bathroom- was able to ambulate back to bed and required return to supine. /81 initially, then 94/67 while supine- RN made aware. Deferred additional mobility or ADLs           Bed mobility  Supine to Sit: Stand by assistance (HOB elevated)  Sit to Supine: Contact guard assistance  Scooting: Stand by assistance       Transfers  Sit to stand: Contact guard assistance (bed)  Stand to sit: Contact guard assistance (bed)     Functional Mobility  Equipment: Rolling walker  Level of Assist: min- cga  Distance: to/from bathroom  Comment: slow pace, difficulty following commands for step sequencing with walker        Cognition  Overall Cognitive Status: Exceptions  Arousal/Alertness: Appropriate responses to stimuli  Following Commands: Follows one step commands with repetition  Attention Span: Difficulty dividing attention  Memory: Appears intact  Sequencing: Requires cues for some  Orientation  Overall Orientation Status: Within Normal Limits     Education: pt educated on anterior hip precautions. Pt noted to continuously gross LEs when seated and in bed and needed prompting to maintain precautions. Holderness rolls placed between Pt LEs when pt returned to supine to assist maintaining precautions      Treatment consisted of:  ADLs, transfer training, education on activity promotion and fall precautions.        AM-PAC Score  AM-[facility] Inpatient Daily Activity Raw Score: 20 (07/06/22 2846)  AM-PAC Inpatient ADL T-Scale Score : 42.03 (07/06/22 1646)  ADL Inpatient CMS 0-100% Score: 38.32 (07/06/22 1646)  ADL Inpatient CMS G-Code Modifier : CJ (07/06/22 1646)    Goals  Short Term Goals  Time Frame for Short term goals: d/c  Short Term Goal 1: toilet transfer with supervision  Short Term Goal 2: supervision LB dressing using AE prn  Short Term Goal 3: supervision bathroom mobility using walker  Short Term Goal 4: able to state anterior hip precautions       Therapy Time   Individual Concurrent Group Co-treatment   Time In 1205         Time Out 1250         Minutes 45         Timed Code Treatment Minutes: 30 Minutes +15 min heaven Bennett, OT

## 2022-07-06 NOTE — OP NOTE
Joshua Padilla MD  [place] Office: [address] [facility], [address], [address] [facility] Deepak Morales 77 [phone] [facility] [address]  : 1950  PCP: Alexis Hamilton    Surgery Date: 2022      OPERATIVE REPORT    PRE-OP DIAGNOSIS: Left Hip Osteoarthritis       POST-OP DIAGNOSIS: Same    PROCEDURE: Left Total Hip Arthroplasty (CPT 53601)    SURGEON: Army Misael HSU    ASSISTANT:  Surgical Assistant: Parth Echavarria, for the purposes of retraction, assistance for reduction, and manipulation of the limb, and assistance with wound closure as needed. ANESTHESIA: General Anesthesia with intra-op field block    ANTIBIOTIC: CEFAZOLIN, DOSING PER NURSING CHART,  1g incisional vancomycin    ESTIMATED BLOOD LOSS:  368QL    COMPLICATIONS:  None    IMPLANTS USED:   * No implants in log *  [place]   Trident II Cluster hole 56mm F  X3 Poly insert 36mmF  Insignia High offset stem size 3  Biolox Delta V40 36mm head +0    HISTORY OF PRESENT ILLNESS:  The patient is a(n) 80 y.o. male with a history of Primary osteoarthritis of left hip [M16.12] who underwent multiple conservative therapies including anti-inflammatories, injections, activity modification, maintaining ideal body weight, and use of assistive devices. Once all of these measures had failed surgical options were discussed. No guarantees were made or implied. Informed consent was obtained. RISKS OF SURGERY:  An extensive conversation was held with the patient and/or family regarding the risks, benefits, potential complication and reasonable expectations of the planned procedure. Informed verbal consent was given under no distress. We had ample opportunities for questions regarding the usual outcomes.   Risks specifically discussed, but not limited to the following, include possible: bleeding, infection, damage to blood vessels and/ornerves, blood clots, anesthesia complications, fracture, hardware failure, need for additional surgery, and post-operative stiffness, looseness, leg length discrepancy, and dislocation. In addition, in rare cases the patient may have loss of a limb or even death. No guarantee of any particular results were given. The patient voiced understanding that in some cases surgery can make them worse. In spite of this, the patient and/or family desired that we proceed with the operation and expressed clear understanding of these risks. DETAILS OF PROCEDURE: The patient was given the appropriate preoperative antibiotics, brought into the operating room, and placed in the supine position on the traction table. The operative extremity was prepped and draped in the standard sterile fashion. A timeout was performed in which the appropriate side, site, procedure, patient, and implants were confirmed. After making sure the antibiotics were administered, I made a standard incision lateral and distal to the ASIS towards the lateral knee. I divided the subcutaneous tissues in line with the incision, and incised the fascia sharply. I dissected the interval between the abductor and the sartorius. I identified the lateral circumflex vessels which were coagulated using electrocautery and/or bipolar. I made a capsulotomy along the femoral neck, and cleaned the synovial tissue. I put retractors on either side of the  femoral neck. I made an osteotomoy of the neck. I put some traction on the leg and removed the remaining head from the acetabulum with a corkscrew. I removed the remaining labrum from the remaining acetabulum. I began sequentially reaming, after bringing in the C-arm and balancing the pelvis. Once I felt this had good bleeding bone I went ahead and mounted the appropriate sized cup under direct vision, and malleted it into place using C-Arm fluoroscopy. It went in very well and had very good fixation. I checked it under fluoroscopy. After placing the cup, it had mosdest stability.  I then put 2 screw(s) in the usual manner for increased fixation. At this point, I went ahead and put the liner in without difficulty. Confirmed secure. Once the liner was in place, I went ahead and turned to the femur. I externally rotated and lowered the leg. I released the capsule and external rotators as needed for better access to the femur. At this point, I began broaching with a box osteotome and continued to the appropriate size stem based on fluoroscopy. Once I had this sized, I went ahead and trialed. I reduced it. It was stable through a range of motion. I took x-rays of hips and overlayed the films. At this point, I felt that the stem was the correct size. I also felt that our length and offset were acceptable. At this point, I came back in and carefully dislocated the hip. It was very stable. I then went ahead and put the real stem in and the real head on a clean, dry taper. I reduced it and it remained stable through range of motion. Once again, I took x-rays as described. I felt this was very good fixation, good alignment, and good stability, as well as recreation of the offset and leg length. I copiously irrigated with sterile saline. I achieved hemostasis using electrocautery and then closed the fascia of the tensor fascia tina with a running locking suture, and closed the rest in the usual layered fashion. The patient tolerated the procedure well. At the end of the procedure the instrument count was correct. There were no complications. Transferred stable to the PACU. POSTOPERATIVE COURSE:   Weight bearing as tolerated. PT for mobility. DVT ppx with ASA 81mg BID  Antibiotic prophylaxis x 1 post-op dose  Follow up in 1-2 weeks for wound check and X-Ray of the left hip   Prednisone x14 days to limit inflamation        MIKI Randall MD  [facility]  Office: [phone]  Cell: [phone]    07/06/22  9:05 AM

## 2022-07-06 NOTE — PROGRESS NOTES
4 Eyes Admission Assessment     I agree as the admission nurse that 2 RN's have performed a thorough Head to Toe Skin Assessment on the patient. ALL assessment sites listed below have been assessed on admission. Areas assessed by both nurses:   [x]   Head, Face, and Ears   [x]   Shoulders, Back, and Chest  [x]   Arms, Elbows, and Hands   [x]   Coccyx, Sacrum, and Ischium  [x]   Legs, Feet, and Heels        Does the Patient have Skin Breakdown?   No         Joshua Prevention initiated:  No   Wound Care Orders initiated:  No      [facility] nurse consulted for Pressure Injury (Stage 3,4, Unstageable, DTI, NWPT, and Complex wounds) or Joshua score 18 or lower:  No      Nurse 1 eSignature: Electronically signed by Lianne Meneses RN on 7/6/22 at 5:24 PM EDT    **SHARE this note so that the co-signing nurse is able to place an eSignature**    Nurse 2 eSignature: Electronically signed by Phoebe Xavier RN on 7/6/22 at 6:00 PM EDT

## 2022-07-07 VITALS
SYSTOLIC BLOOD PRESSURE: 120 MMHG | HEIGHT: 69 IN | OXYGEN SATURATION: 94 % | DIASTOLIC BLOOD PRESSURE: 79 MMHG | HEART RATE: 87 BPM | TEMPERATURE: 98.1 F | WEIGHT: 191 LBS | BODY MASS INDEX: 28.29 KG/M2 | RESPIRATION RATE: 16 BRPM

## 2022-07-07 LAB
ANION GAP SERPL CALCULATED.3IONS-SCNC: 12 MMOL/L (ref 3–16)
BUN BLDV-MCNC: 15 MG/DL (ref 7–20)
CALCIUM SERPL-MCNC: 9.3 MG/DL (ref 8.3–10.6)
CHLORIDE BLD-SCNC: 101 MMOL/L (ref 99–110)
CO2: 23 MMOL/L (ref 21–32)
CREAT SERPL-MCNC: 0.7 MG/DL (ref 0.8–1.3)
GFR AFRICAN AMERICAN: >60
GFR NON-AFRICAN AMERICAN: >60
GLUCOSE BLD-MCNC: 231 MG/DL (ref 70–99)
HCT VFR BLD CALC: 33.2 % (ref 40.5–52.5)
HEMOGLOBIN: 11.4 G/DL (ref 13.5–17.5)
POTASSIUM SERPL-SCNC: 3.9 MMOL/L (ref 3.5–5.1)
SODIUM BLD-SCNC: 136 MMOL/L (ref 136–145)

## 2022-07-07 PROCEDURE — G0378 HOSPITAL OBSERVATION PER HR: HCPCS

## 2022-07-07 PROCEDURE — 85018 HEMOGLOBIN: CPT

## 2022-07-07 PROCEDURE — 85014 HEMATOCRIT: CPT

## 2022-07-07 PROCEDURE — 96374 THER/PROPH/DIAG INJ IV PUSH: CPT

## 2022-07-07 PROCEDURE — 80048 BASIC METABOLIC PNL TOTAL CA: CPT

## 2022-07-07 PROCEDURE — 97116 GAIT TRAINING THERAPY: CPT

## 2022-07-07 PROCEDURE — 2580000003 HC RX 258: Performed by: ORTHOPAEDIC SURGERY

## 2022-07-07 PROCEDURE — 96376 TX/PRO/DX INJ SAME DRUG ADON: CPT

## 2022-07-07 PROCEDURE — 97530 THERAPEUTIC ACTIVITIES: CPT

## 2022-07-07 PROCEDURE — 36415 COLL VENOUS BLD VENIPUNCTURE: CPT

## 2022-07-07 PROCEDURE — 6360000002 HC RX W HCPCS: Performed by: ORTHOPAEDIC SURGERY

## 2022-07-07 PROCEDURE — 97535 SELF CARE MNGMENT TRAINING: CPT

## 2022-07-07 PROCEDURE — 6370000000 HC RX 637 (ALT 250 FOR IP): Performed by: ORTHOPAEDIC SURGERY

## 2022-07-07 PROCEDURE — 97110 THERAPEUTIC EXERCISES: CPT

## 2022-07-07 PROCEDURE — 96372 THER/PROPH/DIAG INJ SC/IM: CPT

## 2022-07-07 RX ADMIN — ACETAMINOPHEN 1000 MG: 500 TABLET ORAL at 06:57

## 2022-07-07 RX ADMIN — SENNOSIDES AND DOCUSATE SODIUM 1 TABLET: 50; 8.6 TABLET ORAL at 11:15

## 2022-07-07 RX ADMIN — MELOXICAM 3.75 MG: 7.5 TABLET ORAL at 12:18

## 2022-07-07 RX ADMIN — CEFAZOLIN 2000 MG: 2 INJECTION, POWDER, FOR SOLUTION INTRAMUSCULAR; INTRAVENOUS at 00:14

## 2022-07-07 RX ADMIN — AMLODIPINE BESYLATE 10 MG: 10 TABLET ORAL at 11:15

## 2022-07-07 RX ADMIN — ENOXAPARIN SODIUM 40 MG: 100 INJECTION SUBCUTANEOUS at 11:14

## 2022-07-07 RX ADMIN — ESCITALOPRAM OXALATE 10 MG: 10 TABLET ORAL at 11:14

## 2022-07-07 RX ADMIN — ATORVASTATIN CALCIUM 10 MG: 10 TABLET, FILM COATED ORAL at 11:14

## 2022-07-07 RX ADMIN — DEXAMETHASONE SODIUM PHOSPHATE 4 MG: 4 INJECTION, SOLUTION INTRAMUSCULAR; INTRAVENOUS at 11:15

## 2022-07-07 RX ADMIN — SODIUM CHLORIDE, PRESERVATIVE FREE 10 ML: 5 INJECTION INTRAVENOUS at 11:28

## 2022-07-07 RX ADMIN — ATENOLOL 100 MG: 50 TABLET ORAL at 11:14

## 2022-07-07 RX ADMIN — LISINOPRIL 40 MG: 40 TABLET ORAL at 11:15

## 2022-07-07 RX ADMIN — OXYCODONE 5 MG: 5 TABLET ORAL at 14:53

## 2022-07-07 RX ADMIN — DEXAMETHASONE SODIUM PHOSPHATE 4 MG: 4 INJECTION, SOLUTION INTRAMUSCULAR; INTRAVENOUS at 00:13

## 2022-07-07 RX ADMIN — OXYCODONE 5 MG: 5 TABLET ORAL at 06:57

## 2022-07-07 RX ADMIN — GABAPENTIN 300 MG: 300 CAPSULE ORAL at 11:15

## 2022-07-07 RX ADMIN — OXYCODONE 5 MG: 5 TABLET ORAL at 00:18

## 2022-07-07 RX ADMIN — OXYCODONE 5 MG: 5 TABLET ORAL at 11:21

## 2022-07-07 ASSESSMENT — PAIN SCALES - GENERAL
PAINLEVEL_OUTOF10: 5
PAINLEVEL_OUTOF10: 5
PAINLEVEL_OUTOF10: 3
PAINLEVEL_OUTOF10: 4
PAINLEVEL_OUTOF10: 4

## 2022-07-07 ASSESSMENT — PAIN DESCRIPTION - DESCRIPTORS: DESCRIPTORS: ACHING

## 2022-07-07 ASSESSMENT — PAIN DESCRIPTION - LOCATION: LOCATION: HIP

## 2022-07-07 ASSESSMENT — PAIN DESCRIPTION - ORIENTATION: ORIENTATION: LEFT

## 2022-07-07 NOTE — PROGRESS NOTES
Occupational Therapy  Facility/Department: [facility] 5T ORTHO/NEURO  Daily Treatment Note  NAME: María Corona  : 1950  MRN: [id number]    Date of Service: 2022    Discharge Recommendations: María Corona scored a 21/24 on the AM-PAC ADL Inpatient form. Current research shows that an AM-PAC score of 18 or greater is typically associated with a discharge to the patient's home setting. Based on the patient's AM-PAC score, and their current ADL deficits, it is recommended that the patient have 2-3 sessions per week of Occupational Therapy at d/c to increase the patient's independence. At this time, this patient demonstrates the endurance and safety to discharge home with assist as needed and a follow up treatment frequency of 2-3x/wk. Please see assessment section for further patient specific details. If patient discharges prior to next session this note will serve as a discharge summary. Please see below for the latest assessment towards goals. Patient Diagnosis(es): The encounter diagnosis was Primary osteoarthritis of left hip. Assessment         Assessment: Pt tolerated session well. Improving in funct mob and transfers. No concerns with home d/c. Demo good understanding with precautions. Showing safe skills and awareness for home d/c with assist as needed- Pt verb wife can provide any assistance at home. Cont with POC      Activity Tolerance: Patient tolerated treatment well      Plan   Plan  Times per Week: 7  Times per Day: Daily     Restrictions       Subjective   Subjective  Subjective: Pt in chair upon arrival. Pleasant and agreeable to OT.  RN arrived midsession for morning meds  Pain: 3/10 pain in L hip, RN aware  Orientation  Overall Orientation Status: Within Normal Limits  Pain: denies pain           Objective    Vitals     Bed Mobility Training  Bed Mobility Training: Yes  Supine to Sit: Independent      Balance  Sitting: Intact  Standing: Intact      Transfer Training  Transfer Training: Yes  Sit to Stand: Modified independent  Stand to Sit: Modified independent  Toilet Transfer: Modified independent (ambulating, grab bar used, regular toilet)      Gait  Overall Level of Assistance: Modified independent  Distance (ft): 300 Feet  Assistive Device: Gait belt;Walker, rolling           ADL  Feeding: Independent  Grooming: Modified independent  (stance at sink for oral care, hand hygiene and washing face)  UE Dressing: Independent  LE Dressing: Stand by assistance (for cues for precautions. SBA for pants. Able to don RLE sock, DEP for LLE sock.)  Toileting: Modified independent            Safety Devices  Type of Devices: Left in chair;Call light within reach; Chair alarm in place;Nurse notified         Patient Education  Education Given To: Patient  Education Provided: Role of Therapy;Plan of Care;Precautions; ADL Adaptive Strategies;Transfer Training; Fall Prevention Strategies  Education Method: Demonstration;Verbal  Barriers to Learning: None  Education Outcome: Verbalized understanding;Demonstrated understanding    Goals  Short Term Goals  Time Frame for Short term goals: d/c  Short Term Goal 1: toilet transfer with supervision- met 7/7  Short Term Goal 2: supervision LB dressing using AE prn- not met  Short Term Goal 3: supervision bathroom mobility using walker- met 7/7  Short Term Goal 4: able to state anterior hip precautions- met 7/7       Therapy Time   Individual Concurrent Group Co-treatment   Time In 1027         Time Out 1150         Minutes 83         Timed Code Treatment Minutes: 83 Minutes       REY Jiménez/PENELOPE

## 2022-07-07 NOTE — CARE COORDINATION
[facility]    Received referral for homecare services. Will follow patient for homecare services at discharge. Should Pt DC over weekend, fax face sheet, order, ROSA MARIA, and H&P to [facility]. Electronically signed by Vicky Gottron, LPN on 4/1/33 at 48:99 AM EDT      [facility]    Spoke with patient regarding [facility] services. Patient aware and agreeable to services. Demographics verified. Orders to be sent via direct link to [facility]. Electronically signed by Vicky Gottron, LPN on 7/0/31 at 74:05 PM EDT        Vicky Gottron LPN  [facility] [address] Transition Nurse  [phone]

## 2022-07-07 NOTE — CARE COORDINATION
Case Management Assessment            Discharge Note                    Date / Time of Note: 7/7/2022 9:08 AM                  Discharge Note Completed by: TREVER Spaulding    Patient Name: Chasidy Diaz   YOB: 1950  Diagnosis: Primary osteoarthritis of left hip [M16.12]  Osteoarthritis of one hip, left [M16.12]   Date / Time: 7/6/2022  5:42 AM    Current PCP: [address] patient: No    Hospitalization in the last 30 days: No    Advance Directives:  Code Status: Full Code  [address] DNR form completed and on chart: No    Financial:  Payor: Dora Tesfaye / Plan: Sondra Galvan / Product Type: *No Product type* /      Pharmacy:    [facility] [id number] - [address] [place], [address] [facility]  [address] [facility] [address]  [facility] [place]  Phone: [phone] Fax: [phone]      Assistance purchasing medications?: Potential Assistance Purchasing Medications: No  Assistance provided by Case Management: None at this time    Does patient want to participate in local refill/ meds to beds program?: Yes    Meds To Beds General Rules:  1. Can ONLY be done Monday- Friday between 8:30am-5pm  2. Prescription(s) must be in pharmacy by 3pm to be filled same day  3. Copy of patient's insurance/ prescription drug card and patient face sheet must be sent along with the prescription(s)  4. Cost of Rx cannot be added to hospital bill. If financial assistance is needed, please contact unit  or ;  or  CANNOT provide pharmacy voucher for patients co-pays  5.  Patients can then  the prescription on their way out of the hospital at discharge, or pharmacy can deliver to the bedside if staff is available. (payment due at time of pick-up or delivery - cash, check, or card accepted)     Able to afford home medications/ co-pay costs: Yes    ADLS:  Current PT AM-PAC Score: 17 /24  Current OT AM-PAC Score: 20 /24      DISCHARGE Disposition: Home with [address]: [facility]     LOC at discharge: Skilled  [address] Completed: Yes    Notification completed in HENS/PAS?:  Not Applicable    IMM Completed:   Not Indicated    Transportation:  Transportation PLAN for discharge: family   Mode of Transport: [address] ordered at discharge: Yes  [address]: [facility]  Phone: [phone]  Fax: [phone]  Orders faxed: Yes    Durable Medical Equipment:  DME Provider: n/a  Equipment obtained during hospitalization: n/a pt already owns     Home Oxygen and Respiratory Equipment:  Oxygen needed at discharge?: Not [address]: Not Applicable  Portable tank available for discharge?: Not Indicated    Dialysis:  Dialysis patient: No    Dialysis Center:  Not Applicable    Additional CM Notes: SW met with pt at bedside, he was open to home health PT. Order placed with [facility]. Pt reports his  daughter and neighbor will provide transport home    The Plan for Transition of Care is related to the following treatment goals of Primary osteoarthritis of left hip [M16.12]  Osteoarthritis of one hip, left [M16.12]    The Patient and/or patient representative Jason Sanders and his family were provided with a choice of provider and agrees with the discharge plan Yes    Freedom of choice list was provided with basic dialogue that supports the patient's individualized plan of care/goals and shares the quality data associated with the providers.  Yes    Care Transitions patient: No    TREVER Dyer  [facility] [employer]  Case Management Department  Ph: [phone]  Fax: [phone]

## 2022-07-07 NOTE — PLAN OF CARE
Problem: Discharge Planning  Goal: Discharge to home or other facility with appropriate resources  7/7/2022 1357 by Smith Almanzar RN  Outcome: Progressing  7/7/2022 0232 by Luz Elena Warner RN  Outcome: Progressing  Flowsheets (Taken 7/6/2022 1648 by Christal Cardenas RN)  Discharge to home or other facility with appropriate resources:   Identify barriers to discharge with patient and caregiver   Identify discharge learning needs (meds, wound care, etc)     Problem: Pain  Goal: Verbalizes/displays adequate comfort level or baseline comfort level  7/7/2022 1357 by Smith Almanzar RN  Outcome: Progressing  7/7/2022 0232 by Luz Elena Warner RN  Outcome: Progressing  Flowsheets (Taken 7/7/2022 0232)  Verbalizes/displays adequate comfort level or baseline comfort level:   Encourage patient to monitor pain and request assistance   Assess pain using appropriate pain scale   Notify Licensed Independent Practitioner if interventions unsuccessful or patient reports new pain   Consider cultural and social influences on pain and pain management   Implement non-pharmacological measures as appropriate and evaluate response   Administer analgesics based on type and severity of pain and evaluate response     Problem: Safety - Adult  Goal: Free from fall injury  7/7/2022 1357 by Smith Almanzar RN  Outcome: Progressing  7/7/2022 0232 by Luz Elena Warner RN  Outcome: Progressing

## 2022-07-07 NOTE — DISCHARGE INSTR - COC
Continuity of Care Form    Patient Name: Joesph Fernández   :  1950  MRN:  [id number]    [facility] date:  2022  Discharge date:  ***    Code Status Order: Full Code   Advance Directives:   Advance Care Flowsheet Documentation       Date/Time Healthcare Directive Type of Healthcare Directive Copy in [address] Agent's Name Healthcare Agent's Phone Number    [phone] Yes, patient has an advance directive for healthcare treatment Living will No, copy requested from family -- -- --            Admitting Physician:  Edi Vazquez MD  PCP: Pebbles Rosa    Discharging Nurse: [facility] Unit/Room#: 9855/2971-27  Discharging Unit Phone Number: ***    Emergency Contact:   Extended Emergency Contact Information  Primary Emergency Contact: Latosha Germain  Address: [address] [place] [address] Phone: [phone]  Work Phone: [phone]  Mobile Phone: [phone]  Relation: Spouse  Secondary Emergency Contact: Guillermo Lopez, [facility] Phone: [phone]  Mobile Phone: [phone]  Relation: Child  Preferred language: English   needed?  No    Past Surgical History:  Past Surgical History:   Procedure Laterality Date    COLONOSCOPY  3/27/2015    HERNIA REPAIR      HIP ARTHROPLASTY Right     JOINT REPLACEMENT Bilateral     knee    LUMBAR FUSION      NOSE SURGERY      SHOULDER ARTHROPLASTY      right x 2 and left x1    TOTAL HIP ARTHROPLASTY Left 2022    LEFT TOTAL HIP ARTHROPLASTY ANTERIOR APPROACH performed by Edi Vazquez MD at [address] [place] [address]         Immunization History:   Immunization History   Administered Date(s) Administered    COVID-19, MODERNA BLUE border, Primary or Immunocompromised, (age 12y+), IM, 100 mcg/0.5mL 2021       Active Problems:  Patient Active Problem List   Diagnosis Code    Osteoarthritis of one hip, left M16.12       Isolation/Infection:   Isolation            No Isolation Patient Infection Status       None to display            Nurse Assessment:  Last Vital Signs: /79   Pulse 87   Temp 98.1 °F (36.7 °C) (Oral)   Resp 16   Ht 5' 9\" (1.753 m)   Wt 191 lb (86.6 kg)   SpO2 94%   BMI 28.21 kg/m²     Last documented pain score (0-10 scale): Pain Level: 4  Last Weight:   Wt Readings from Last 1 Encounters:   22 191 lb (86.6 kg)     Mental Status:  {IP PT MENTAL STATUS:}    IV Access:  { ROSA MARIA IV ACCESS:105241041}    Nursing Mobility/ADLs:  Walking   {[facility] DME ICKA:439383940}  Transfer  {[facility] DME WJXW:883934670}  Bathing  {[facility] DME BTQL:712372335}  Dressing  {[facility] DME RZDZ:504920683}  Toileting  {[facility] DME NCAX:281568736}  Feeding  {[facility] DME VZIX:988644225}  Med Admin  {[facility] DME ROAA:681310218}  Med Delivery   { ROSA MARIA MED Delivery:393334791}    Wound Care Documentation and Therapy:  Incision 22 Hip Anterior; Left (Active)   Dressing Status Clean;Dry; Intact 22 0400   Dressing/Treatment Foam;Tegaderm/transparent film dressing 22 0400   Drainage Amount None 22 0400   Odor None 22 0400   Number of days: 1        Elimination:  Continence: Bowel: {YES / BF:94382}  Bladder: {YES / D}  Urinary Catheter: {Urinary Catheter:754254259}   Colostomy/Ileostomy/Ileal Conduit: {YES / ZH:38392}       Date of Last BM: ***    Intake/Output Summary (Last 24 hours) at 2022 1252  Last data filed at 2022 2300  Gross per 24 hour   Intake 600 ml   Output 400 ml   Net 200 ml     I/O last 3 completed shifts: In: 200 [P.O.:450;  I.V.:1450]  Out: 700 [Urine:400; Blood:300]    Safety Concerns:     [address] [employer] Safety Concerns:080746161}    Impairments/Disabilities:      [address] Impairments/Disabilities:434558288}    Nutrition Therapy:  Current Nutrition Therapy:   [address] CRANE Diet List:718070170}    Routes of Feeding: {CHP DME Other Feedings:652316536}  Liquids: {Slp liquid thickness:12691}  Daily Fluid Restriction: {CHP DME Yes amt example:881441175}  Last Modified Barium Swallow with Video (Video Swallowing Test): {Done Not Done SUX:978471231}    Treatments at the Time of Hospital Discharge:   Respiratory Treatments: ***  Oxygen Therapy:  {Therapy; copd oxygen:72530}  Ventilator:    {MH CC Vent AYM}    Rehab Therapies: PT OT  Weight Bearing Status/Restrictions: 50George Wilhelm CC Weight Bearin}  Other Medical Equipment (for information only, NOT a DME order):  {EQUIPMENT:285600874}  Other Treatments: ***    Patient's personal belongings (please select all that are sent with patient):  {CHP DME Belongings:206142993}    RN SIGNATURE:  {Esignature:792546843}    CASE MANAGEMENT/SOCIAL WORK SECTION    Inpatient Status Date: ***    Readmission Risk Assessment Score:  Readmission Risk              Risk of Unplanned Readmission:  0           Discharging to Facility/ Agency   Name:  [facility] care    Address: [address] [place] [address], [address], [place] [address]  Phone: [phone]  Fax: [phone]      / signature: {Esignature:238270528}    PHYSICIAN SECTION    Prognosis: {Prognosis:3351283738}    Condition at Discharge: [address] Wilhelm Patient Condition:022902677}    Rehab Potential (if transferring to Rehab): {Prognosis:7034999050}    Recommended Labs or Other Treatments After Discharge: ***    Physician Certification: I certify the above information and transfer of Jesse Woo  is necessary for the continuing treatment of the diagnosis listed and that he requires {Admit to Appropriate Level of Care:47472} for {GREATER/LESS:593691916} 30 days.      Update Admission H&P: {CHP DME Changes in OPVM}    PHYSICIAN SIGNATURE:  {Esignature:404941383}

## 2022-07-07 NOTE — PROGRESS NOTES
Physical Therapy  Facility/Department: [facility] 5T ORTHO/NEURO  Daily Treatment Note  NAME: Chasidy Diaz  : 1950  MRN: [id number]    Date of Service: 2022    Discharge Recommendations: Chasidy Diaz scored a 22/24 on the AM-PAC short mobility form. Current research shows that an AM-PAC score of 18 or greater is typically associated with a discharge to the patient's home setting. Based on the patient's AM-PAC score and their current functional mobility deficits, it is recommended that the patient have 2-3 sessions per week of Physical Therapy at d/c to increase the patient's independence.  At this time, this patient demonstrates the endurance and safety to discharge home with home services and a follow up treatment frequency of 2-3x/wk. Please see assessment section for further patient specific details. PT Equipment Recommendations  Equipment Needed: No  Other: pt owns RW  Patient Diagnosis(es): The encounter diagnosis was Primary osteoarthritis of left hip. Assessment   Assessment: Pt tolerated session well without an increase in pain and pt was asymptomatic with positional changes. Requires frequent cues to prevent leg crossing when sitting. Presented with good trunk and LE control during bed mobility with the bed flat and without use of the handrails. Transfers and gait training were steady and safe without sequencing cues. Managed the stairs safely with min cues; no overt LOB or buckling noted.   Equipment Needed: No  Other: pt owns RW     Plan    Plan  Plan: 2 times a day 7 days a week     Restrictions  Position Activity Restriction  Other position/activity restrictions: FWB, activity as tolerated, anterior precautions (per verbal discussion with RN)     Subjective    Subjective  Subjective: Pt was in bed willing to participate  Pain: denies pain  Orientation  Overall Orientation Status: Within Normal Limits     Objective   Bed Mobility Training  Bed Mobility Training: Yes  Supine to Sit: Independent  Transfer Training  Transfer Training: Yes  Sit to Stand: Modified independent  Stand to Sit: Modified independent  Gait Training  Gait Training: Yes  Left Side Weight Bearing: As tolerated  Gait  Overall Level of Assistance: Modified independent  Gait Abnormalities: slow antalgic reciprocal pattern with decreased stride length. Distance (ft): 200 Feet  Assistive Device: Walker, rolling  Stairs  Rail Use: Left  Stairs - Level of Assistance: Modified independent  Number of Stairs Trained: 6     PT Exercises  Reviewed HEP x10 BLE and hip precautions:  Sitting EOB for approx 2x5 mins. Safety Devices  Type of Devices: Chair alarm in place;Call light within reach; Left in chair     Goals  Short Term Goals  Time Frame for Short term goals: discharge  Short term goal 1: Pt will transfer supine <--> sit with supervision  Short term goal 2: Pt will transfer sit <--> stand with supervision  Short term goal 3: Pt will amb 150 ft with RW and SBA  Short term goal 4: Pt will negotiate 2 steps with CGA  Short term goal 5: Pt will demo good understanding of anterior hip precautions  Patient Goals   Patient goals : return home    Education  Patient Education  Education Given To: Patient  Education Provided: Role of Therapy;Precautions  Education Provided Comments: anterior hip precautions, WBAT, HEP  Education Method: Demonstration;Verbal    Therapy Time   Individual Concurrent Group Co-treatment   Time In 0815         Time Out 0915         Minutes Lili Romano, PTA

## 2022-07-07 NOTE — DISCHARGE SUMMARY
Silvestre Cosme MD  [place] Office: [address] [id number], [address] [facility] [place]  [phone] [place]    Discharge Summary  Hip Arthroplasty    Date:  2022    Name:  Damon Todd  Address:    [address] [place] [address]  [place] [address]    :  1950      Age:   80 y.o. Medical Record Number:  [id number]    Discharge Date:      Admitting Physician  Phuc Bourne MD     Discharge Physician:  Phuc Bourne MD     Admission Diagnoses:  Primary osteoarthritis of left hip [M16.12]  Osteoarthritis of one hip, left [M16.12]     Discharge Diagnoses:  Primary osteoarthritis of left hip [M16.12]  Osteoarthritis of one hip, left [M16.12]     Treatments:  Left  Total Hip Arthroplasty    Hospital Course: This is a 80 y.o. male with a history of Left  hip degenerative joint disease that failed conservative treatment. The patient elected to undergo arthroplasty after discussing the risks, benefits, and alternatives to surgery. Left  Total hip arthroplasty was performed without complication. Post op their diet was advanced as tolerated. Pain was controlled with a combination of IV and PO meds. DVT prophylaxis was with a combination of SCDs and Aspirin. On POD #1 the patient began physical therapy and made appropriate progress. After evaluation by the orthopaedic surgeon, and PT/OT, discharge was allowed on POD #1. The patient was medically stable during hospitalization. Consults:   PT/OT    Significant Diagnostic Studies:  Xrays following surgery show a Left  hip arthroplasty in appropriate position without evidence of fracture or malposition. Disposition:  home     Meds:  See Med Reconciliation    Discharge Condition:  Improved    Patient Instructions: Activity: activity as tolerated  Diet: diet as tolerated  Wound Care: keep wound clean and dry, reinforce dressing PRN and ice to area for comfort.    DVT Prophylaxis: Aspirin x4 weeks  Follow-up with Physical Therapy and Surgeon as currently scheduled

## 2022-07-07 NOTE — PLAN OF CARE
Problem: Discharge Planning  Goal: Discharge to home or other facility with appropriate resources  Outcome: Progressing  Flowsheets (Taken 7/6/2022 1648 by Raynard Lefort, RN)  Discharge to home or other facility with appropriate resources:   Identify barriers to discharge with patient and caregiver   Identify discharge learning needs (meds, wound care, etc)     Problem: Pain  Goal: Verbalizes/displays adequate comfort level or baseline comfort level  Outcome: Progressing  Flowsheets (Taken 7/7/2022 0232)  Verbalizes/displays adequate comfort level or baseline comfort level:   Encourage patient to monitor pain and request assistance   Assess pain using appropriate pain scale   Notify Licensed Independent Practitioner if interventions unsuccessful or patient reports new pain   Consider cultural and social influences on pain and pain management   Implement non-pharmacological measures as appropriate and evaluate response   Administer analgesics based on type and severity of pain and evaluate response     Problem: Safety - Adult  Goal: Free from fall injury  Outcome: Progressing     Problem: ABCDS Injury Assessment  Goal: Absence of physical injury  Outcome: Progressing

## 2022-07-07 NOTE — PROGRESS NOTES
Pt A&O x4, VSS. Pain being managed with medications per MAR. Surgical dressing to left hip CDI. Voiding adequately x 1 walker to bathroom and via urinal. Tolerating diet and fluids. Fall precautions in place. Pt in bed with call light in reach.

## 2022-07-10 ENCOUNTER — APPOINTMENT (OUTPATIENT)
Dept: GENERAL RADIOLOGY | Age: 72
End: 2022-07-10
Payer: MEDICARE

## 2022-07-10 ENCOUNTER — HOSPITAL ENCOUNTER (EMERGENCY)
Age: 72
Discharge: HOME | End: 2022-07-10
Attending: EMERGENCY MEDICINE
Payer: MEDICARE

## 2022-07-10 VITALS
RESPIRATION RATE: 17 BRPM | DIASTOLIC BLOOD PRESSURE: 77 MMHG | WEIGHT: 192 LBS | BODY MASS INDEX: 28.44 KG/M2 | HEART RATE: 63 BPM | TEMPERATURE: 98 F | HEIGHT: 69 IN | SYSTOLIC BLOOD PRESSURE: 137 MMHG | OXYGEN SATURATION: 96 %

## 2022-07-10 DIAGNOSIS — S73.005A DISLOCATION OF LEFT HIP, INITIAL ENCOUNTER (HCC): Primary | ICD-10-CM

## 2022-07-10 PROCEDURE — 73501 X-RAY EXAM HIP UNI 1 VIEW: CPT

## 2022-07-10 PROCEDURE — 27250 TREAT HIP DISLOCATION: CPT

## 2022-07-10 PROCEDURE — 73502 X-RAY EXAM HIP UNI 2-3 VIEWS: CPT

## 2022-07-10 PROCEDURE — 2580000003 HC RX 258: Performed by: EMERGENCY MEDICINE

## 2022-07-10 PROCEDURE — 96375 TX/PRO/DX INJ NEW DRUG ADDON: CPT

## 2022-07-10 PROCEDURE — 2500000003 HC RX 250 WO HCPCS

## 2022-07-10 PROCEDURE — 99285 EMERGENCY DEPT VISIT HI MDM: CPT

## 2022-07-10 PROCEDURE — 2700000000 HC OXYGEN THERAPY PER DAY

## 2022-07-10 PROCEDURE — 94761 N-INVAS EAR/PLS OXIMETRY MLT: CPT

## 2022-07-10 PROCEDURE — 96374 THER/PROPH/DIAG INJ IV PUSH: CPT

## 2022-07-10 PROCEDURE — 6360000002 HC RX W HCPCS: Performed by: EMERGENCY MEDICINE

## 2022-07-10 RX ORDER — FENTANYL CITRATE 50 UG/ML
100 INJECTION, SOLUTION INTRAMUSCULAR; INTRAVENOUS ONCE
Status: COMPLETED | OUTPATIENT
Start: 2022-07-10 | End: 2022-07-10

## 2022-07-10 RX ORDER — KETAMINE HYDROCHLORIDE 100 MG/ML
0.5 INJECTION, SOLUTION INTRAMUSCULAR; INTRAVENOUS ONCE
Status: COMPLETED | OUTPATIENT
Start: 2022-07-10 | End: 2022-07-10

## 2022-07-10 RX ORDER — KETAMINE HYDROCHLORIDE 100 MG/ML
INJECTION, SOLUTION INTRAMUSCULAR; INTRAVENOUS
Status: COMPLETED
Start: 2022-07-10 | End: 2022-07-10

## 2022-07-10 RX ORDER — PROPOFOL 10 MG/ML
200 INJECTION, EMULSION INTRAVENOUS ONCE
Status: COMPLETED | OUTPATIENT
Start: 2022-07-10 | End: 2022-07-10

## 2022-07-10 RX ORDER — 0.9 % SODIUM CHLORIDE 0.9 %
1000 INTRAVENOUS SOLUTION INTRAVENOUS ONCE
Status: COMPLETED | OUTPATIENT
Start: 2022-07-10 | End: 2022-07-10

## 2022-07-10 RX ADMIN — KETAMINE HYDROCHLORIDE 50 MG: 100 INJECTION, SOLUTION INTRAMUSCULAR; INTRAVENOUS at 15:46

## 2022-07-10 RX ADMIN — HYDROMORPHONE HYDROCHLORIDE 1 MG: 1 INJECTION, SOLUTION INTRAMUSCULAR; INTRAVENOUS; SUBCUTANEOUS at 15:32

## 2022-07-10 RX ADMIN — FENTANYL CITRATE 100 MCG: 50 INJECTION, SOLUTION INTRAMUSCULAR; INTRAVENOUS at 15:04

## 2022-07-10 RX ADMIN — KETAMINE HYDROCHLORIDE 50 MG: 100 INJECTION INTRAMUSCULAR; INTRAVENOUS at 15:46

## 2022-07-10 RX ADMIN — SODIUM CHLORIDE 1000 ML: 9 INJECTION, SOLUTION INTRAVENOUS at 15:35

## 2022-07-10 RX ADMIN — PROPOFOL 30 MG: 10 INJECTION, EMULSION INTRAVENOUS at 15:57

## 2022-07-10 ASSESSMENT — PAIN DESCRIPTION - LOCATION
LOCATION: HIP

## 2022-07-10 ASSESSMENT — PAIN SCALES - GENERAL
PAINLEVEL_OUTOF10: 10
PAINLEVEL_OUTOF10: 0
PAINLEVEL_OUTOF10: 0
PAINLEVEL_OUTOF10: 10
PAINLEVEL_OUTOF10: 0
PAINLEVEL_OUTOF10: 0

## 2022-07-10 ASSESSMENT — PAIN DESCRIPTION - FREQUENCY: FREQUENCY: CONTINUOUS

## 2022-07-10 ASSESSMENT — PAIN DESCRIPTION - ORIENTATION
ORIENTATION: LEFT

## 2022-07-10 ASSESSMENT — LIFESTYLE VARIABLES: HOW OFTEN DO YOU HAVE A DRINK CONTAINING ALCOHOL: NEVER

## 2022-07-10 ASSESSMENT — PAIN DESCRIPTION - DESCRIPTORS
DESCRIPTORS: SHOOTING;ACHING
DESCRIPTORS: CRAMPING

## 2022-07-10 ASSESSMENT — PAIN DESCRIPTION - DIRECTION: RADIATING_TOWARDS: DOWN LEFT LEG

## 2022-07-10 ASSESSMENT — PAIN - FUNCTIONAL ASSESSMENT
PAIN_FUNCTIONAL_ASSESSMENT: NONE - DENIES PAIN
PAIN_FUNCTIONAL_ASSESSMENT: 0-10
PAIN_FUNCTIONAL_ASSESSMENT: NONE - DENIES PAIN
PAIN_FUNCTIONAL_ASSESSMENT: NONE - DENIES PAIN

## 2022-07-10 ASSESSMENT — PAIN DESCRIPTION - PAIN TYPE: TYPE: ACUTE PAIN

## 2022-07-10 NOTE — ED NOTES
Patient awake and alert at this time. Patient speaking to staff in full sentences.       Indy Lentz RN  07/10/22 2402

## 2022-07-10 NOTE — ED PROVIDER NOTES
Hyperlipidemia     Hypertension     PONV (postoperative nausea and vomiting)         Surgical History:   Past Surgical History:   Procedure Laterality Date    COLONOSCOPY  3/27/2015    HERNIA REPAIR      HIP ARTHROPLASTY Right     JOINT REPLACEMENT Bilateral     knee    LUMBAR FUSION      NOSE SURGERY      SHOULDER ARTHROPLASTY      right x 2 and left x1    TOTAL HIP ARTHROPLASTY Left 7/6/2022    LEFT TOTAL HIP ARTHROPLASTY ANTERIOR APPROACH performed by Claribel Griffin MD at [address]          Family History:  History reviewed. No pertinent family history. Social History     Socioeconomic History    Marital status:      Spouse name: Not on file    Number of children: Not on file    Years of education: Not on file    Highest education level: Not on file   Occupational History    Not on file   Tobacco Use    Smoking status: Never Smoker    Smokeless tobacco: Never Used   Vaping Use    Vaping Use: Never used   Substance and Sexual Activity    Alcohol use: Yes     Comment: beer 1-2 day & [place] weekly but none in month ([place])    Drug use: Never    Sexual activity: Not on file   Other Topics Concern    Not on file   Social History Narrative    Not on file     Social Determinants of Health     Financial Resource Strain:     Difficulty of Paying Living Expenses: Not on file   Food Insecurity:     Worried About Running Out of Food in the Last Year: Not on file    [place] of Food in the Last Year: Not on file   Transportation Needs:     Lack of Transportation (Medical): Not on file    Lack of Transportation (Non-Medical):  Not on file   Physical Activity:     Days of Exercise per Week: Not on file    Minutes of Exercise per Session: Not on file   Stress:     Feeling of Stress : Not on file   Social Connections:     Frequency of Communication with Friends and Family: Not on file    Frequency of Social Gatherings with Friends and Family: Not on file    Attends Nondenominational Services: Not on file    Active Member of Clubs or Organizations: Not on file    Attends Club or Organization Meetings: Not on file    Marital Status: Not on file   Intimate Partner Violence:     Fear of Current or Ex-Partner: Not on file    Emotionally Abused: Not on file    Physically Abused: Not on file    Sexually Abused: Not on file   Housing Stability:     Unable to Pay for Housing in the Last Year: Not on file    Number of [place] in the Last Year: Not on file    Unstable Housing in the Last Year: Not on file       Nursing notes reviewed. ED Triage Vitals [07/10/22 1413]   Enc Vitals Group      BP (!) 147/81      Heart Rate 60      Resp 20      Temp 99.2 °F (37.3 °C)      Temp Source Oral      SpO2 98 %      Weight 192 lb (87.1 kg)      Height 5' 9\" (1.753 m)      Head Circumference       Peak Flow       Pain Score       Pain Loc       Pain Edu? Excl. in [address]? GENERAL:  Awake, alert. Well developed, well nourished with moderate distress due to pain. HENT:  Normocephalic, Atraumatic, moist mucous membranes. EYES:  Pupils equal round and reactive to light, Conjunctiva normal, extraocular movements normal.  NECK:  No meningeal signs, Supple. CHEST:  Regular rate and rhythm, chest wall non-tender. LUNGS:  Clear to auscultation bilaterally. ABDOMEN:  Soft, non-tender, no rebound, rigidity or guarding, non-distended, normal bowel sounds. No costovertebral angle tenderness to palpation. BACK:  No tenderness. EXTREMITIES: Left hip held flexed to 90 degrees and unable to perform range of motion due to pain, tender near the left greater trochanter, no other tenderness in the left lower extremity, distal pulses present. Neurovascular intact to the toes left foot. SKIN: Warm, dry and left hip surgical wound with dry gauze and Adaptic dressing, some light erythema but no warmth or tenderness or induration to suggest infection. No drainage. Isabelle Christianson    NEUROLOGIC: Normal mental status. Moving all extremities to command. RADIOLOGY  X-RAYS:  I have reviewed radiologic plain film image(s). ALL OTHER NON-PLAIN FILM IMAGES SUCH AS CT, ULTRASOUND AND MRI HAVE BEEN READ BY THE RADIOLOGIST. XR HIP LEFT (2-3 VIEWS)   Preliminary Result   Successful interval reduction of the patient's previously identified left hip   arthroplasty dislocation. XR HIP LEFT (1 VIEW)   Final Result   1. Left hip arthroplasty with superior dislocation. PROCEDURES  PROCEDURE:  JOINT REDUCTION  The benefits, risks, and alternatives of hip reduction were discussed with Cecile Loza or their surrogate. An opportunity to ask questions was provided, and consent was given for the procedure. Once adequately anesthetized, the hip was easily reduced using Whistler technique. Following successful reduction, immobilization was performed and the extremity's neurovascular status was checked and it was intact. The patient tolerated the procedure without complications. PROCEDURE:  PROCEDURAL SEDATION  Pre-sedation Assessment  Intended Procedure: hip reduction  Pre-Procedure Assessment/Plan:  Airway:Mallampati class II (soft palate, uvula, fauces visible)  ASA 3 - Patient with moderate systemic disease with functional limitations  Level of Sedation Plan: Moderate sedation  Post Procedure plan: Return to same level of care  I assessed the patient and find that the patient is in satisfactory condition to proceed with the planned procedure and sedation plan. The benefits, risks, and alternatives of procedural sedation were discussed with Cecile Loza or their surrogate. We discussed the potential side effects or adverse reactions that could occur with ketamine and propofol. An opportunity to ask questions was provided, and consent was given for the procedure. Oxygen was administered, and the appropriate pre-procedural policies were followed.  Hospital protocol for cardiac, oxygen saturation, and blood Dragon dictation system. I created this record but it may contain dictation errors.           Carla Montgomery MD  07/10/22 8576

## 2022-07-10 NOTE — PROGRESS NOTES
RT at bedside for conscious sedation with Dr. Jose Covarrubias and ER RN x2. Patient placed on EtCO2 nasal cannula at 3 LPM. SpO2 during sedation remained within normal limits as did the patient's EtCO2. Patient awake and alert after sedation. RN x2 at bedside.      Electronically signed by Demetris Paredes, RAJI, RRT, RRT-ACCS on 7/10/2022 at 4:13 PM        07/10/22 1548   Oxygen Therapy/Pulse Ox   O2 Therapy Oxygen   $Oxygen $Daily Charge   O2 Device Nasal cannula   O2 Flow Rate (L/min) 3 L/min   Heart Rate 75   Resp 16   SpO2 100 %   $Pulse Oximeter $Spot check (multiple/continuous)   End Tidal CO2 39 (%)

## 2022-07-25 ENCOUNTER — TELEPHONE (OUTPATIENT)
Dept: ORTHOPEDIC SURGERY | Age: 72
End: 2022-07-25

## 2022-12-06 ENCOUNTER — APPOINTMENT (OUTPATIENT)
Dept: CT IMAGING | Age: 72
End: 2022-12-06
Payer: MEDICARE

## 2022-12-06 ENCOUNTER — APPOINTMENT (OUTPATIENT)
Dept: GENERAL RADIOLOGY | Age: 72
End: 2022-12-06
Payer: MEDICARE

## 2022-12-06 ENCOUNTER — HOSPITAL ENCOUNTER (OUTPATIENT)
Age: 72
Setting detail: OBSERVATION
Discharge: HOME | End: 2022-12-07
Attending: EMERGENCY MEDICINE | Admitting: INTERNAL MEDICINE
Payer: MEDICARE

## 2022-12-06 DIAGNOSIS — I51.7 CARDIOMEGALY: ICD-10-CM

## 2022-12-06 DIAGNOSIS — R55 SYNCOPE AND COLLAPSE: Primary | ICD-10-CM

## 2022-12-06 PROBLEM — I10 HYPERTENSION: Status: ACTIVE | Noted: 2022-12-06

## 2022-12-06 PROBLEM — D72.829 LEUKOCYTOSIS: Status: ACTIVE | Noted: 2022-12-06

## 2022-12-06 LAB
A/G RATIO: 2 (ref 1.1–2.2)
ALBUMIN SERPL-MCNC: 4.5 G/DL (ref 3.4–5)
ALP BLD-CCNC: 63 U/L (ref 40–129)
ALT SERPL-CCNC: 18 U/L (ref 10–40)
ANION GAP SERPL CALCULATED.3IONS-SCNC: 9 MMOL/L (ref 3–16)
AST SERPL-CCNC: 21 U/L (ref 15–37)
BASOPHILS ABSOLUTE: 0.1 K/UL (ref 0–0.2)
BASOPHILS RELATIVE PERCENT: 0.5 %
BILIRUB SERPL-MCNC: 0.5 MG/DL (ref 0–1)
BUN BLDV-MCNC: 23 MG/DL (ref 7–20)
CALCIUM SERPL-MCNC: 9.4 MG/DL (ref 8.3–10.6)
CHLORIDE BLD-SCNC: 100 MMOL/L (ref 99–110)
CO2: 28 MMOL/L (ref 21–32)
CREAT SERPL-MCNC: 0.8 MG/DL (ref 0.8–1.3)
EOSINOPHILS ABSOLUTE: 0.4 K/UL (ref 0–0.6)
EOSINOPHILS RELATIVE PERCENT: 3.8 %
GFR SERPL CREATININE-BSD FRML MDRD: >60 ML/MIN/{1.73_M2}
GLUCOSE BLD-MCNC: 135 MG/DL (ref 70–99)
HCT VFR BLD CALC: 44.7 % (ref 40.5–52.5)
HEMOGLOBIN: 15 G/DL (ref 13.5–17.5)
LYMPHOCYTES ABSOLUTE: 2 K/UL (ref 1–5.1)
LYMPHOCYTES RELATIVE PERCENT: 16.9 %
MAGNESIUM: 2.2 MG/DL (ref 1.8–2.4)
MCH RBC QN AUTO: 31.3 PG (ref 26–34)
MCHC RBC AUTO-ENTMCNC: 33.7 G/DL (ref 31–36)
MCV RBC AUTO: 93 FL (ref 80–100)
MONOCYTES ABSOLUTE: 1 K/UL (ref 0–1.3)
MONOCYTES RELATIVE PERCENT: 9 %
NEUTROPHILS ABSOLUTE: 8.1 K/UL (ref 1.7–7.7)
NEUTROPHILS RELATIVE PERCENT: 69.8 %
PDW BLD-RTO: 13.8 % (ref 12.4–15.4)
PLATELET # BLD: 308 K/UL (ref 135–450)
PMV BLD AUTO: 6.8 FL (ref 5–10.5)
POTASSIUM REFLEX MAGNESIUM: 3.9 MMOL/L (ref 3.5–5.1)
PRO-BNP: 211 PG/ML (ref 0–124)
RBC # BLD: 4.8 M/UL (ref 4.2–5.9)
SODIUM BLD-SCNC: 137 MMOL/L (ref 136–145)
TOTAL PROTEIN: 6.7 G/DL (ref 6.4–8.2)
TROPONIN: <0.01 NG/ML
TROPONIN: <0.01 NG/ML
WBC # BLD: 11.6 K/UL (ref 4–11)

## 2022-12-06 PROCEDURE — 84484 ASSAY OF TROPONIN QUANT: CPT

## 2022-12-06 PROCEDURE — 2580000003 HC RX 258: Performed by: NURSE PRACTITIONER

## 2022-12-06 PROCEDURE — 96361 HYDRATE IV INFUSION ADD-ON: CPT

## 2022-12-06 PROCEDURE — 84439 ASSAY OF FREE THYROXINE: CPT

## 2022-12-06 PROCEDURE — 70450 CT HEAD/BRAIN W/O DYE: CPT

## 2022-12-06 PROCEDURE — 71045 X-RAY EXAM CHEST 1 VIEW: CPT

## 2022-12-06 PROCEDURE — 83735 ASSAY OF MAGNESIUM: CPT

## 2022-12-06 PROCEDURE — G0378 HOSPITAL OBSERVATION PER HR: HCPCS

## 2022-12-06 PROCEDURE — 2580000003 HC RX 258: Performed by: EMERGENCY MEDICINE

## 2022-12-06 PROCEDURE — 6360000004 HC RX CONTRAST MEDICATION: Performed by: EMERGENCY MEDICINE

## 2022-12-06 PROCEDURE — 93005 ELECTROCARDIOGRAM TRACING: CPT | Performed by: EMERGENCY MEDICINE

## 2022-12-06 PROCEDURE — 80053 COMPREHEN METABOLIC PANEL: CPT

## 2022-12-06 PROCEDURE — 99285 EMERGENCY DEPT VISIT HI MDM: CPT

## 2022-12-06 PROCEDURE — 71260 CT THORAX DX C+: CPT | Performed by: EMERGENCY MEDICINE

## 2022-12-06 PROCEDURE — 85025 COMPLETE CBC W/AUTO DIFF WBC: CPT

## 2022-12-06 PROCEDURE — 83036 HEMOGLOBIN GLYCOSYLATED A1C: CPT

## 2022-12-06 PROCEDURE — 6370000000 HC RX 637 (ALT 250 FOR IP): Performed by: NURSE PRACTITIONER

## 2022-12-06 PROCEDURE — 36415 COLL VENOUS BLD VENIPUNCTURE: CPT

## 2022-12-06 PROCEDURE — 83880 ASSAY OF NATRIURETIC PEPTIDE: CPT

## 2022-12-06 PROCEDURE — 96360 HYDRATION IV INFUSION INIT: CPT

## 2022-12-06 PROCEDURE — 84443 ASSAY THYROID STIM HORMONE: CPT

## 2022-12-06 RX ORDER — ATENOLOL 25 MG/1
50 TABLET ORAL DAILY
Status: DISCONTINUED | OUTPATIENT
Start: 2022-12-07 | End: 2022-12-07 | Stop reason: HOSPADM

## 2022-12-06 RX ORDER — SODIUM CHLORIDE 0.9 % (FLUSH) 0.9 %
5-40 SYRINGE (ML) INJECTION PRN
Status: DISCONTINUED | OUTPATIENT
Start: 2022-12-06 | End: 2022-12-07 | Stop reason: HOSPADM

## 2022-12-06 RX ORDER — ACETAMINOPHEN 325 MG/1
650 TABLET ORAL EVERY 6 HOURS PRN
Status: DISCONTINUED | OUTPATIENT
Start: 2022-12-06 | End: 2022-12-07 | Stop reason: HOSPADM

## 2022-12-06 RX ORDER — 0.9 % SODIUM CHLORIDE 0.9 %
1000 INTRAVENOUS SOLUTION INTRAVENOUS ONCE
Status: COMPLETED | OUTPATIENT
Start: 2022-12-06 | End: 2022-12-06

## 2022-12-06 RX ORDER — M-VIT,TX,IRON,MINS/CALC/FOLIC 27MG-0.4MG
1 TABLET ORAL DAILY
Status: DISCONTINUED | OUTPATIENT
Start: 2022-12-06 | End: 2022-12-07 | Stop reason: HOSPADM

## 2022-12-06 RX ORDER — SENNA AND DOCUSATE SODIUM 50; 8.6 MG/1; MG/1
1 TABLET, FILM COATED ORAL DAILY PRN
Status: DISCONTINUED | OUTPATIENT
Start: 2022-12-06 | End: 2022-12-07 | Stop reason: HOSPADM

## 2022-12-06 RX ORDER — SODIUM CHLORIDE, SODIUM LACTATE, POTASSIUM CHLORIDE, CALCIUM CHLORIDE 600; 310; 30; 20 MG/100ML; MG/100ML; MG/100ML; MG/100ML
INJECTION, SOLUTION INTRAVENOUS ONCE
Status: COMPLETED | OUTPATIENT
Start: 2022-12-06 | End: 2022-12-07

## 2022-12-06 RX ORDER — ASPIRIN 81 MG/1
81 TABLET ORAL 2 TIMES DAILY
Status: DISCONTINUED | OUTPATIENT
Start: 2022-12-06 | End: 2022-12-06

## 2022-12-06 RX ORDER — SODIUM CHLORIDE 0.9 % (FLUSH) 0.9 %
5-40 SYRINGE (ML) INJECTION EVERY 12 HOURS SCHEDULED
Status: DISCONTINUED | OUTPATIENT
Start: 2022-12-06 | End: 2022-12-07 | Stop reason: HOSPADM

## 2022-12-06 RX ORDER — AMLODIPINE BESYLATE 5 MG/1
10 TABLET ORAL DAILY
Status: DISCONTINUED | OUTPATIENT
Start: 2022-12-07 | End: 2022-12-07 | Stop reason: HOSPADM

## 2022-12-06 RX ORDER — LISINOPRIL 20 MG/1
20 TABLET ORAL DAILY
Status: DISCONTINUED | OUTPATIENT
Start: 2022-12-07 | End: 2022-12-07 | Stop reason: HOSPADM

## 2022-12-06 RX ORDER — ATORVASTATIN CALCIUM 10 MG/1
10 TABLET, FILM COATED ORAL NIGHTLY
Status: DISCONTINUED | OUTPATIENT
Start: 2022-12-06 | End: 2022-12-07

## 2022-12-06 RX ORDER — PROCHLORPERAZINE EDISYLATE 5 MG/ML
10 INJECTION INTRAMUSCULAR; INTRAVENOUS EVERY 6 HOURS PRN
Status: DISCONTINUED | OUTPATIENT
Start: 2022-12-06 | End: 2022-12-07 | Stop reason: HOSPADM

## 2022-12-06 RX ORDER — SODIUM CHLORIDE 9 MG/ML
INJECTION, SOLUTION INTRAVENOUS PRN
Status: DISCONTINUED | OUTPATIENT
Start: 2022-12-06 | End: 2022-12-07 | Stop reason: HOSPADM

## 2022-12-06 RX ORDER — POLYETHYLENE GLYCOL 3350 17 G/17G
17 POWDER, FOR SOLUTION ORAL DAILY PRN
Status: DISCONTINUED | OUTPATIENT
Start: 2022-12-06 | End: 2022-12-07 | Stop reason: HOSPADM

## 2022-12-06 RX ORDER — ACETAMINOPHEN 650 MG/1
650 SUPPOSITORY RECTAL EVERY 6 HOURS PRN
Status: DISCONTINUED | OUTPATIENT
Start: 2022-12-06 | End: 2022-12-07 | Stop reason: HOSPADM

## 2022-12-06 RX ORDER — ESCITALOPRAM OXALATE 10 MG/1
10 TABLET ORAL NIGHTLY
Status: DISCONTINUED | OUTPATIENT
Start: 2022-12-06 | End: 2022-12-07 | Stop reason: HOSPADM

## 2022-12-06 RX ORDER — GABAPENTIN 300 MG/1
300 CAPSULE ORAL 3 TIMES DAILY
Status: DISCONTINUED | OUTPATIENT
Start: 2022-12-06 | End: 2022-12-07 | Stop reason: HOSPADM

## 2022-12-06 RX ORDER — AMOXICILLIN AND CLAVULANATE POTASSIUM 875; 125 MG/1; MG/1
1 TABLET, FILM COATED ORAL EVERY 12 HOURS SCHEDULED
Status: DISCONTINUED | OUTPATIENT
Start: 2022-12-06 | End: 2022-12-07 | Stop reason: HOSPADM

## 2022-12-06 RX ADMIN — GABAPENTIN 300 MG: 300 CAPSULE ORAL at 21:54

## 2022-12-06 RX ADMIN — IOPAMIDOL 75 ML: 755 INJECTION, SOLUTION INTRAVENOUS at 17:57

## 2022-12-06 RX ADMIN — SODIUM CHLORIDE 1000 ML: 9 INJECTION, SOLUTION INTRAVENOUS at 20:15

## 2022-12-06 RX ADMIN — SODIUM CHLORIDE, PRESERVATIVE FREE 10 ML: 5 INJECTION INTRAVENOUS at 21:54

## 2022-12-06 RX ADMIN — SODIUM CHLORIDE, POTASSIUM CHLORIDE, SODIUM LACTATE AND CALCIUM CHLORIDE: 600; 310; 30; 20 INJECTION, SOLUTION INTRAVENOUS at 21:53

## 2022-12-06 RX ADMIN — ATORVASTATIN CALCIUM 10 MG: 10 TABLET, FILM COATED ORAL at 21:54

## 2022-12-06 RX ADMIN — ESCITALOPRAM OXALATE 10 MG: 10 TABLET ORAL at 21:54

## 2022-12-06 RX ADMIN — ASPIRIN 81 MG: 81 TABLET, COATED ORAL at 21:54

## 2022-12-06 RX ADMIN — AMOXICILLIN AND CLAVULANATE POTASSIUM 1 TABLET: 875; 125 TABLET, FILM COATED ORAL at 21:53

## 2022-12-06 RX ADMIN — Medication 1 TABLET: at 21:54

## 2022-12-06 ASSESSMENT — PAIN - FUNCTIONAL ASSESSMENT
PAIN_FUNCTIONAL_ASSESSMENT: 0-10
PAIN_FUNCTIONAL_ASSESSMENT: NONE - DENIES PAIN

## 2022-12-06 ASSESSMENT — PAIN DESCRIPTION - LOCATION
LOCATION: HEAD
LOCATION: HEAD

## 2022-12-06 ASSESSMENT — LIFESTYLE VARIABLES
HOW OFTEN DO YOU HAVE A DRINK CONTAINING ALCOHOL: NEVER
HOW MANY STANDARD DRINKS CONTAINING ALCOHOL DO YOU HAVE ON A TYPICAL DAY: PATIENT DOES NOT DRINK

## 2022-12-06 ASSESSMENT — PAIN DESCRIPTION - FREQUENCY: FREQUENCY: CONTINUOUS

## 2022-12-06 ASSESSMENT — PAIN DESCRIPTION - DESCRIPTORS
DESCRIPTORS: ACHING
DESCRIPTORS: ACHING

## 2022-12-06 ASSESSMENT — PAIN SCALES - GENERAL
PAINLEVEL_OUTOF10: 0
PAINLEVEL_OUTOF10: 4

## 2022-12-06 ASSESSMENT — PAIN DESCRIPTION - PAIN TYPE: TYPE: ACUTE PAIN

## 2022-12-06 NOTE — ED NOTES
Patient identified as a positive fall risk on the ED triage fall screening. Patient placed in fall precautions which includes:  yellow fall risk bracelet on wrist and yellow socks on feet. Patient instructed on importance of not getting out of bed or ambulating without assistance for safety. Pt verbalized understanding.        Lencho Mckeon RN  12/06/22 1828

## 2022-12-07 VITALS
HEIGHT: 69 IN | BODY MASS INDEX: 29.41 KG/M2 | SYSTOLIC BLOOD PRESSURE: 126 MMHG | WEIGHT: 198.6 LBS | TEMPERATURE: 98.8 F | OXYGEN SATURATION: 95 % | RESPIRATION RATE: 18 BRPM | DIASTOLIC BLOOD PRESSURE: 79 MMHG | HEART RATE: 69 BPM

## 2022-12-07 LAB
ANION GAP SERPL CALCULATED.3IONS-SCNC: 8 MMOL/L (ref 3–16)
BACTERIA: ABNORMAL /HPF
BASOPHILS ABSOLUTE: 0 K/UL (ref 0–0.2)
BASOPHILS RELATIVE PERCENT: 0.4 %
BILIRUBIN URINE: NEGATIVE
BLOOD, URINE: NEGATIVE
BUN BLDV-MCNC: 15 MG/DL (ref 7–20)
CALCIUM SERPL-MCNC: 8.7 MG/DL (ref 8.3–10.6)
CHLORIDE BLD-SCNC: 102 MMOL/L (ref 99–110)
CHOLESTEROL, TOTAL: 156 MG/DL (ref 0–199)
CLARITY: CLEAR
CO2: 26 MMOL/L (ref 21–32)
COLOR: YELLOW
CREAT SERPL-MCNC: 0.7 MG/DL (ref 0.8–1.3)
EKG ATRIAL RATE: 63 BPM
EKG DIAGNOSIS: NORMAL
EKG P AXIS: -12 DEGREES
EKG P-R INTERVAL: 318 MS
EKG Q-T INTERVAL: 442 MS
EKG QRS DURATION: 108 MS
EKG QTC CALCULATION (BAZETT): 452 MS
EKG R AXIS: -49 DEGREES
EKG T AXIS: 5 DEGREES
EKG VENTRICULAR RATE: 63 BPM
EOSINOPHILS ABSOLUTE: 0.4 K/UL (ref 0–0.6)
EOSINOPHILS RELATIVE PERCENT: 4.1 %
EPITHELIAL CELLS, UA: ABNORMAL /HPF (ref 0–5)
ESTIMATED AVERAGE GLUCOSE: 131.2 MG/DL
GFR SERPL CREATININE-BSD FRML MDRD: >60 ML/MIN/{1.73_M2}
GLUCOSE BLD-MCNC: 125 MG/DL (ref 70–99)
GLUCOSE URINE: NEGATIVE MG/DL
HBA1C MFR BLD: 6.2 %
HCT VFR BLD CALC: 41.2 % (ref 40.5–52.5)
HDLC SERPL-MCNC: 41 MG/DL (ref 40–60)
HEMOGLOBIN: 13.8 G/DL (ref 13.5–17.5)
KETONES, URINE: NEGATIVE MG/DL
LDL CHOLESTEROL CALCULATED: 81 MG/DL
LEUKOCYTE ESTERASE, URINE: NEGATIVE
LYMPHOCYTES ABSOLUTE: 1.6 K/UL (ref 1–5.1)
LYMPHOCYTES RELATIVE PERCENT: 17.9 %
MCH RBC QN AUTO: 31 PG (ref 26–34)
MCHC RBC AUTO-ENTMCNC: 33.5 G/DL (ref 31–36)
MCV RBC AUTO: 92.5 FL (ref 80–100)
MICROSCOPIC EXAMINATION: ABNORMAL
MONOCYTES ABSOLUTE: 0.8 K/UL (ref 0–1.3)
MONOCYTES RELATIVE PERCENT: 8.5 %
NEUTROPHILS ABSOLUTE: 6.2 K/UL (ref 1.7–7.7)
NEUTROPHILS RELATIVE PERCENT: 69.1 %
NITRITE, URINE: NEGATIVE
PDW BLD-RTO: 13.6 % (ref 12.4–15.4)
PH UA: 7 (ref 5–8)
PLATELET # BLD: 269 K/UL (ref 135–450)
PMV BLD AUTO: 7.2 FL (ref 5–10.5)
POTASSIUM REFLEX MAGNESIUM: 3.8 MMOL/L (ref 3.5–5.1)
PROTEIN UA: NEGATIVE MG/DL
RBC # BLD: 4.45 M/UL (ref 4.2–5.9)
RBC UA: ABNORMAL /HPF (ref 0–4)
SODIUM BLD-SCNC: 136 MMOL/L (ref 136–145)
SPECIFIC GRAVITY UA: 1.02 (ref 1–1.03)
T4 FREE: 1.4 NG/DL (ref 0.9–1.8)
TRIGL SERPL-MCNC: 169 MG/DL (ref 0–150)
TROPONIN: <0.01 NG/ML
TSH SERPL DL<=0.05 MIU/L-ACNC: 0.67 UIU/ML (ref 0.27–4.2)
URINE TYPE: ABNORMAL
UROBILINOGEN, URINE: 0.2 E.U./DL
VLDLC SERPL CALC-MCNC: 34 MG/DL
WBC # BLD: 9 K/UL (ref 4–11)
WBC UA: ABNORMAL /HPF (ref 0–5)

## 2022-12-07 PROCEDURE — 80048 BASIC METABOLIC PNL TOTAL CA: CPT

## 2022-12-07 PROCEDURE — 97116 GAIT TRAINING THERAPY: CPT

## 2022-12-07 PROCEDURE — 97530 THERAPEUTIC ACTIVITIES: CPT

## 2022-12-07 PROCEDURE — 85025 COMPLETE CBC W/AUTO DIFF WBC: CPT

## 2022-12-07 PROCEDURE — 81001 URINALYSIS AUTO W/SCOPE: CPT

## 2022-12-07 PROCEDURE — 6370000000 HC RX 637 (ALT 250 FOR IP): Performed by: NURSE PRACTITIONER

## 2022-12-07 PROCEDURE — 96361 HYDRATE IV INFUSION ADD-ON: CPT

## 2022-12-07 PROCEDURE — G0378 HOSPITAL OBSERVATION PER HR: HCPCS

## 2022-12-07 PROCEDURE — 93010 ELECTROCARDIOGRAM REPORT: CPT | Performed by: INTERNAL MEDICINE

## 2022-12-07 PROCEDURE — 80061 LIPID PANEL: CPT

## 2022-12-07 PROCEDURE — 2580000003 HC RX 258: Performed by: NURSE PRACTITIONER

## 2022-12-07 PROCEDURE — 36415 COLL VENOUS BLD VENIPUNCTURE: CPT

## 2022-12-07 PROCEDURE — 97161 PT EVAL LOW COMPLEX 20 MIN: CPT

## 2022-12-07 PROCEDURE — 84484 ASSAY OF TROPONIN QUANT: CPT

## 2022-12-07 PROCEDURE — 93306 TTE W/DOPPLER COMPLETE: CPT

## 2022-12-07 PROCEDURE — 99204 OFFICE O/P NEW MOD 45 MIN: CPT | Performed by: INTERNAL MEDICINE

## 2022-12-07 RX ORDER — ATORVASTATIN CALCIUM 10 MG/1
20 TABLET, FILM COATED ORAL NIGHTLY
Status: DISCONTINUED | OUTPATIENT
Start: 2022-12-07 | End: 2022-12-07 | Stop reason: HOSPADM

## 2022-12-07 RX ORDER — LISINOPRIL 20 MG/1
20 TABLET ORAL DAILY
Qty: 30 TABLET | Refills: 3 | Status: SHIPPED | OUTPATIENT
Start: 2022-12-08

## 2022-12-07 RX ORDER — ASPIRIN 81 MG/1
81 TABLET, CHEWABLE ORAL DAILY
Status: DISCONTINUED | OUTPATIENT
Start: 2022-12-07 | End: 2022-12-07 | Stop reason: HOSPADM

## 2022-12-07 RX ORDER — POLYVINYL ALCOHOL 14 MG/ML
1 SOLUTION/ DROPS OPHTHALMIC PRN
Status: DISCONTINUED | OUTPATIENT
Start: 2022-12-07 | End: 2022-12-07 | Stop reason: HOSPADM

## 2022-12-07 RX ORDER — AMOXICILLIN AND CLAVULANATE POTASSIUM 875; 125 MG/1; MG/1
1 TABLET, FILM COATED ORAL EVERY 12 HOURS SCHEDULED
Qty: 14 TABLET | Refills: 0 | Status: SHIPPED | OUTPATIENT
Start: 2022-12-07 | End: 2022-12-14

## 2022-12-07 RX ORDER — ATENOLOL 50 MG/1
50 TABLET ORAL DAILY
Qty: 30 TABLET | Refills: 3 | Status: SHIPPED | OUTPATIENT
Start: 2022-12-08

## 2022-12-07 RX ADMIN — SODIUM CHLORIDE, PRESERVATIVE FREE 10 ML: 5 INJECTION INTRAVENOUS at 08:11

## 2022-12-07 RX ADMIN — AMLODIPINE BESYLATE 10 MG: 5 TABLET ORAL at 08:11

## 2022-12-07 RX ADMIN — Medication 1 TABLET: at 08:11

## 2022-12-07 RX ADMIN — ACETAMINOPHEN 650 MG: 325 TABLET ORAL at 08:10

## 2022-12-07 RX ADMIN — AMOXICILLIN AND CLAVULANATE POTASSIUM 1 TABLET: 875; 125 TABLET, FILM COATED ORAL at 08:11

## 2022-12-07 RX ADMIN — ACETAMINOPHEN 650 MG: 325 TABLET ORAL at 17:42

## 2022-12-07 RX ADMIN — LISINOPRIL 20 MG: 20 TABLET ORAL at 08:11

## 2022-12-07 RX ADMIN — ATENOLOL 50 MG: 25 TABLET ORAL at 08:11

## 2022-12-07 ASSESSMENT — PAIN DESCRIPTION - LOCATION: LOCATION: HEAD

## 2022-12-07 ASSESSMENT — PAIN DESCRIPTION - DESCRIPTORS: DESCRIPTORS: ACHING

## 2022-12-07 ASSESSMENT — PAIN SCALES - GENERAL: PAINLEVEL_OUTOF10: 4

## 2022-12-07 NOTE — PROGRESS NOTES
Hospitalist Progress Note      PCP: Aram Celaya    Date of Admission: 12/6/2022    Chief Complaint: Syncope    Hospital Course: Had an episode of presyncope when getting up from bed. Did not hit his head. Noted patient with recent diagnosis of otitis media. Atenolol reduced at admission. Subjective: No chest pain, no shortness of breath, no nausea, no vomiting. No recurrent episodes of syncope. Medications:  Reviewed    Infusion Medications    sodium chloride       Scheduled Medications    amLODIPine  10 mg Oral Daily    atenolol  50 mg Oral Daily    atorvastatin  10 mg Oral Nightly    gabapentin  300 mg Oral TID    lisinopril  20 mg Oral Daily    sodium chloride flush  5-40 mL IntraVENous 2 times per day    therapeutic multivitamin-minerals  1 tablet Oral Daily    escitalopram  10 mg Oral Nightly    amoxicillin-clavulanate  1 tablet Oral 2 times per day     PRN Meds: polyvinyl alcohol, sennosides-docusate sodium, sodium chloride flush, sodium chloride, polyethylene glycol, acetaminophen **OR** acetaminophen, prochlorperazine, perflutren lipid microspheres      Intake/Output Summary (Last 24 hours) at 12/7/2022 1305  Last data filed at 12/7/2022 1252  Gross per 24 hour   Intake 440 ml   Output 1650 ml   Net -1210 ml       Physical Exam Performed:    /79   Pulse 69   Temp 98.8 °F (37.1 °C) (Oral)   Resp 18   Ht 5' 9\" (1.753 m)   Wt 198 lb 9.6 oz (90.1 kg)   SpO2 95%   BMI 29.33 kg/m²     General appearance: No apparent distress, appears stated age and cooperative. HEENT: Pupils equal, round, and reactive to light. Conjunctivae/corneas clear. Neck: Supple, with full range of motion. No jugular venous distention. Trachea midline. Respiratory:  Normal respiratory effort. Clear to auscultation, bilaterally without Rales/Wheezes/Rhonchi. Cardiovascular: Regular rate and rhythm with normal S1/S2 without murmurs, rubs or gallops.   Abdomen: Soft, non-tender, non-distended with normal bowel sounds. Musculoskeletal: No clubbing, cyanosis or edema bilaterally. Full range of motion without deformity. Skin: Skin color, texture, turgor normal.  No rashes or lesions. Neurologic:  Neurovascularly intact without any focal sensory/motor deficits. Cranial nerves: II-XII intact, grossly non-focal.  Psychiatric: Alert and oriented, thought content appropriate, normal insight  Capillary Refill: Brisk, 3 seconds, normal   Peripheral Pulses: +2 palpable, equal bilaterally       Labs:   Recent Labs     12/06/22  1701 12/07/22  0659   WBC 11.6* 9.0   HGB 15.0 13.8   HCT 44.7 41.2    269     Recent Labs     12/06/22  1701 12/07/22  0659    136   K 3.9 3.8    102   CO2 28 26   BUN 23* 15   CREATININE 0.8 0.7*   CALCIUM 9.4 8.7     Recent Labs     12/06/22  1701   AST 21   ALT 18   BILITOT 0.5   ALKPHOS 63     No results for input(s): INR in the last 72 hours. Recent Labs     12/06/22  1701 12/06/22  2148 12/07/22  0054   TROPONINI <0.01 <0.01 <0.01       Urinalysis:      Lab Results   Component Value Date/Time    NITRU Negative 12/07/2022 05:30 AM    WBCUA None seen 12/07/2022 05:30 AM    BACTERIA Rare 12/07/2022 05:30 AM    RBCUA 0-2 12/07/2022 05:30 AM    BLOODU Negative 12/07/2022 05:30 AM    SPECGRAV 1.020 12/07/2022 05:30 AM    GLUCOSEU Negative 12/07/2022 05:30 AM       Radiology:  CT CHEST PULMONARY EMBOLISM W CONTRAST   Final Result   No evidence of pulmonary embolism or acute pulmonary abnormality. Mild   cardiomegaly. Additional nonemergent findings, as above. CT Head W/O Contrast   Final Result   No acute transcortical infarct or intracranial hemorrhage. Mild chronic   microangiopathy. XR CHEST PORTABLE   Final Result   Cardiomegaly. Focal opacity in the retrocardiac area may represent pneumonic   infiltrate. Minimal linear atelectasis.              IP CONSULT TO HOSPITALIST  IP CONSULT TO CARDIOLOGY    Assessment/Plan:    Active Hospital Problems    Diagnosis Syncope and collapse [R55]      Priority: Medium    Hypertension [I10]      Priority: Medium    Leukocytosis [D72.829]      Priority: Medium     PLAN    Syncope  Unclear. Could be vasovagal.  Also noted patient on high-dose of atenolol which could have caused orthostatic hypotension. Cardiology consulted and input pending. Noted echo is not exactly normal.  We will need cardiology opinion before clearing for discharge    Hypertension  Blood pressure is currently controlled. Patient's symptoms are consistent with orthostatic hypotension. No changes in management at this time except initial dose reduction of atenolol. Acute otitis media  Diagnosed before admission and started on antibiotics. Continue same to complete course. Dyslipidemia  Continue statin. Discussed with the patient. Questions answered. DVT Prophylaxis: Lovenox  Diet: ADULT DIET; Regular;  No Added Salt (3-4 gm)  Code Status: Full Code  PT/OT Eval Status: Independent    Dispo -observation stay pending cardiology opinion regarding syncope and abnormal echo    Appropriate for A1 Discharge Unit: No      Oni Kaur MD

## 2022-12-07 NOTE — CONSULTS
Consult placed    [phone] [facility] Cardiology  Date:12/7/2022,  Time:7:57 AM        Electronically signed by Mike Davey on 12/7/2022 at 7:57 AM

## 2022-12-07 NOTE — H&P
Hospital Medicine History & Physical      PCP: Beau Rodriguez    Date of Admission: 12/06/2022    Time of Service: 1949    Date of Service: Pt seen/examined on 12/06/2022 and placed in observation. Historian: Self, ED documentation    Chief Concern:    Chief Complaint   Patient presents with    Dizziness     Pt states he stood up and felt dizzy and next thing he knew he was on the ground. Reports he felt like his BP dropped. /76 during triage. Endorses headache x 5days. Denies chest pain/SOB. States he feels like he is on too many BP meds. History Of Present Illness:      Rosa Flowers is a 71-year-old male with significant past medical history hypertension, hyperlipidemia, depression, and prediabetes who presents to [facility] emergency department with complaint of syncopal episode x1 today. He states that he has been having inner ear problems over the last couple weeks, currently on an Augmentin regimen for the last 5 days, and thinks that the antibiotic may be causing him to be dizzy. He states over the last couple days, has been having a mild sensation of lightheadedness, particularly with going from a lying or seated position to a standing position. However, today was the most intense with a resulting syncopal episode. He states he was getting up out of his chair, walking from the living room into the kitchen for a snack when he felt very lightheaded. He states the room was not spinning, there were no palpitations, no tunnel vision/vision changes, just felt off balance like his blood pressure was dropping. He states he continued to ambulate to the kitchen to get his blood pressure cuff, when he raises his hands up to reach for he fell backwards and blacked out. He states he was only out for a couple seconds, immediately came back to consciousness, had no lingering symptoms, no chest pain afterwards, no strokelike symptoms such as slurred speech or unilateral hemiplegia. He was able to self assist back onto his feet, ambulate like normal.  States he did fall on his right hip had a little pain but has since subsided. He decided come here due to the syncopal episode and is concerned that his atenolol dosing is too high. His evaluation included laboratory studies, EKG, chest x-ray, CT of the head without contrast, and CT chest PE protocol. Chest x-ray showed cardiomegaly with a focal opacity in the retrocardiac region possibly meaning pneumonic infiltrate hence the CT chest.  CT chest was negative for any acute PE or pulmonary abnormality. CT of the head was negative any acute intracranial abnormalities. EKG showed sinus rhythm with first-degree AV block; IN interval prolonged at 318 ms. Is also unchanged left was anterior fascicular block compared to previous EKGs. Otherwise no acute ST segment or T wave deviations. Pertinent abnormal lab values include BUN 23, blood sugar 135, proBNP 211, and initial troponin less than 0.01. Cell count showed mild leukocytosis 11.6, hemoglobin 15.0, and platelets 055. Orthostatic vital signs were performed by the ED staff, had a greater than 20 point blood pressure drop with an absent heart rate compensation. 1 L bolus of normal saline was given. Hospital team was consulted to place patient observation for syncope.     Past Medical History:          Diagnosis Date    Anxiety     Arthritis     Depression     Foot pain, right     \"collapsed\" surgery to be done after this surgery    Hx of degenerative disc disease     Hyperlipidemia     Hypertension     PONV (postoperative nausea and vomiting)     Prediabetes      Past Surgical History:          Procedure Laterality Date    COLONOSCOPY  3/27/2015    HERNIA REPAIR      HIP ARTHROPLASTY Right     JOINT REPLACEMENT Bilateral     knee    LUMBAR FUSION      NOSE SURGERY      SHOULDER ARTHROPLASTY      right x 2 and left x1    TOTAL HIP ARTHROPLASTY Left 7/6/2022    LEFT TOTAL HIP ARTHROPLASTY ANTERIOR APPROACH performed by No Terrazas MD at [address] [facility]       Medications Prior to Admission:      Prior to Admission medications    Medication Sig Start Date End Date Taking? Authorizing Provider   aspirin EC 81 MG EC tablet Take 1 tablet by mouth 2 times daily 7/6/22   No Terrazas MD   senna-docusate (PERICOLACE) 8.6-50 MG per tablet Take 2 tablets by mouth daily as needed for Constipation 7/6/22   No Terrazas MD   gabapentin (NEURONTIN) 300 MG capsule Take 300 mg by mouth 3 times daily. 6/14/22   Historical Provider, MD   escitalopram (LEXAPRO) 10 MG tablet  4/12/22   Historical Provider, MD   triamcinolone (KENALOG) 0.1 % cream Apply topically as needed eczema 5/6/22   Historical Provider, MD   meloxicam (MOBIC) 15 MG tablet  6/28/22   Historical Provider, MD   atorvastatin (LIPITOR) 10 MG tablet  3/31/22   Historical Provider, MD   Cholecalciferol (VITAMIN D3 PO) Take 1 tablet by mouth daily    Historical Provider, MD   valACYclovir (VALTREX) 1 g tablet Take 2,000 mg by mouth    Historical Provider, MD   butalbital-acetaminophen-caffeine (FIORICET) -40 MG per tablet Take 1 tablet by mouth every 6 hours as needed for Headaches  Patient not taking: Reported on 6/30/2022 12/1/16   VARGAS Lynch - CNP   amLODIPine (NORVASC) 10 MG tablet Take 10 mg by mouth daily. Historical Provider, MD   atenolol (TENORMIN) 100 MG tablet Take 100 mg by mouth daily. Historical Provider, MD   lisinopril (PRINIVIL;ZESTRIL) 40 MG tablet Take 40 mg by mouth daily. Historical Provider, MD   tadalafil (CIALIS) 20 MG tablet Take 20 mg by mouth as needed for Erectile Dysfunction. Historical Provider, MD   Multiple Vitamin (MULTI VITAMIN DAILY PO) Take  by mouth. Patient not taking: Reported on 6/30/2022    Historical Provider, MD   acetaminophen (TYLENOL) 500 MG tablet Take 500 mg by mouth every 6 hours as needed for Pain.     Historical Provider, MD Allergies:  Triple antibiotic [bacitracin-neomycin-polymyxin]    Social History:      The patient currently lives at home with family. TOBACCO:   reports that he has never smoked. He has never used smokeless tobacco.  ETOH:   reports current alcohol use. E-cigarette/Vaping       Questions Responses    E-cigarette/Vaping Use Never User    Start Date     Passive Exposure     Quit Date     Counseling Given     Comments           Family History:      Reviewed in detail at bedside with patient; positive as follows:    History reviewed. No pertinent family history. REVIEW OF SYSTEMS COMPLETED:   Pertinent positives as noted in the HPI. All other systems reviewed and negative. PHYSICAL EXAM PERFORMED:    /70   Pulse 75   Temp 98 °F (36.7 °C) (Oral)   Resp 18   Ht 5' 9\" (1.753 m)   Wt 200 lb (90.7 kg)   SpO2 94%   BMI 29.53 kg/m²     General appearance:  No apparent distress, appears stated age and cooperative. HEENT:  Normal cephalic, atraumatic without obvious deformity. Pupils equal, round, and reactive to light. Extra ocular muscles intact. Conjunctivae/corneas clear. Neck: Supple, with full range of motion. No jugular venous distention noted. Trachea midline. Respiratory:  Normal respiratory effort. Clear to auscultation, bilaterally without Rales/Wheezes/Rhonchi. Cardiovascular:  Regular rate and rhythm with normal S1/S2 without murmurs, rubs or gallops. No LE edema. Abdomen: Soft, non-tender, non-distended with normal bowel sounds. Musculoskeletal:  No clubbing, cyanosis or edema bilaterally. Full range of motion without deformity. Skin: Skin color, texture, turgor normal.  No rashes or lesions. Neurologic:  Neurovascularly intact without any focal sensory/motor deficits.  Cranial nerves: II-XII intact, grossly non-focal.  Psychiatric:  Alert and oriented, thought content appropriate, normal insight  Capillary Refill: Brisk,3 seconds, normal  Peripheral Pulses: +2 palpable, equal bilaterally     Labs:     Recent Labs     22  1701   WBC 11.6*   HGB 15.0   HCT 44.7        Recent Labs     22  1701      K 3.9      CO2 28   BUN 23*   CREATININE 0.8   CALCIUM 9.4     Recent Labs     22  1701   AST 21   ALT 18   BILITOT 0.5   ALKPHOS 63     No results for input(s): INR in the last 72 hours. Recent Labs     22  1701 22  2148   TROPONINI <0.01 <0.01     Radiology:     I have reviewed the radiographic results for this encounter with the following interpretation(s); see report(s) below:    CT CHEST PULMONARY EMBOLISM W CONTRAST   Final Result   No evidence of pulmonary embolism or acute pulmonary abnormality. Mild   cardiomegaly. Additional nonemergent findings, as above. CT Head W/O Contrast   Final Result   No acute transcortical infarct or intracranial hemorrhage. Mild chronic   microangiopathy. XR CHEST PORTABLE   Final Result   Cardiomegaly. Focal opacity in the retrocardiac area may represent pneumonic   infiltrate. Minimal linear atelectasis. EKG:  I have reviewed the EKG with the following interpretation in the absence of a cardiologist: SR with 1st degree AV block, TYESHA prolonged, No acute ST-segment or T-wave deviations noted. EKG from 2022:    Ventura Donaldson Name:     Julio César Gotti             Department:   [facility]   Patient ID:   [id number]                 Room:         blank   Gender:       Male                     Technician:   Dwayne Noland   :                         Requested By: Samra Rodriguez   Order Number: [id number]                Reading MD:   Sloane Webb MD                                    Measurements   Intervals                              Axis             Rate:         66                       P:            -46   AK:           288                      QRS:          -55   QRSD:         115                      T:            24   QT:           434                                       QTc: AOM, perhaps vestibular?  - + orthostatic VS with no HR compensation  - Fluids given in ED, continue judicious MIVF  - Echocardiogram ordered for AM  - Cardiology consulted  - PT consulted for eval and recs  - Repeat orthostatics in AM    Hypertension  - Slightly elevated BP trend in ED  - Will halve dose of atenolol to 50mg, start in AM  - Continue lisinopril and amlodipine at home doses    Acute Leukocytosis  - Mild, 11.6 on admission  - Likely a stress response from today's incident, also, perhaps from recent diagnosis of left AOM  - Examination tonight non-toxic, no findings otherwise suggestive of infection elsewhere  - Urinalysis pending    Left AOM POA  - Continue Augmentin per home prescription    Prediabetes  - Check A1c, 6.1 on last check      DVT Prophylaxis: SCDs  SRMB Prophylaxis: N/A  Diet: ADULT DIET; Regular; Low Fat/Low Chol/High Fiber/2 gm Na  Code Status: Full Code    PT/OT Eval Status: PT consulted    Dispo - 1-2 days per clinical improvement    VARGAS Tran - CNP    Thank you [facility] for the opportunity to be involved in this patient's care.  If you have any questions or concerns please feel free to contact me at [phone].---Anticipated co-signer, Dr. Sandy Avalos    (Please note that portions of this note were completed with a voice recognition program. Efforts were made to edit the dictations but occasionally words are mis-transcribed.)

## 2022-12-07 NOTE — PLAN OF CARE
Problem: Discharge Planning  Goal: Discharge to home or other facility with appropriate resources  12/7/2022 1011 by Tala Gross RN  Outcome: Progressing  12/6/2022 2210 by Luis Charlton RN  Outcome: Progressing     Problem: Pain  Goal: Verbalizes/displays adequate comfort level or baseline comfort level  12/7/2022 1011 by Tala Gross RN  Outcome: Progressing  12/6/2022 2210 by Luis Charlton RN  Outcome: Progressing

## 2022-12-07 NOTE — CONSULTS
CARDIOLOGY CONSULTATION        Patient Name: Peewee Plascencia  Date of admission: 12/6/2022  4:21 PM  Admission Dx: Cardiomegaly [I51.7]  Syncope and collapse [R55]  Requesting Physician: Nav Martins MD  Primary Care physician: Chery Archibald    Reason for Consultation/Chief Complaint: Syncopal episode, cardiomegaly by imaging    History of Present Illness:     Peewee Plascencia is a 71 y.o. patient with a past medical history notable for hypertension, hyperlipidemia, who presented to the hospital with complaints of positional dizziness with episode of syncope,    ED course/Vital signs on presentation: Orthostatics positive in ED with greater than 20 point drop in systolic blood pressure. EKG showed normal sinus rhythm with first-degree AV block, left anterior fascicular block, poor R wave progression. CT chest showed mild cardiomegaly. Calcified thoracic aorta. No evidence of PE. Mild chronic interstitial fibrosis was noted. Echocardiogram today showed normal LVEF with possible basal to mid inferior hypokinesis. Mild mitral regurgitation. Moderate aortic regurgitation. Labs on admission notable for proBNP 211, serial troponins, mild azotemia. Today patient states he got up from seated position, walking to kitchen to get BP cuff. On reaching upward, felt a little dizzy, then he promptly had LOC and fell to the floor. Fell on R hip. No prior history of this. He had been prescribed Z-roberto carlos recently for URI symptoms. No GI losses. No BP med changes recently preceding event. The patient denies chest pain/pressure/heaviness. He tries to stay active, cycling x 20 min at a time few days per week. No angina with this activity. No shortness of breath at rest and dyspnea with exertion. Denies paroxysmal nocturnal dyspnea, orthopnea, increasing lower extremity edema or weight gain. Pt follows with Dr. Sandro Srivastava.        Past Medical History:   has a past medical history of Anxiety, Arthritis, Depression, Foot pain, right, Hx of degenerative disc disease, Hyperlipidemia, Hypertension, PONV (postoperative nausea and vomiting), and Prediabetes. Surgical History:   has a past surgical history that includes hernia repair; Nose surgery; Shoulder Arthroplasty; Colonoscopy (3/27/2015); Hip Arthroplasty (Right); lumbar fusion; joint replacement (Bilateral); Vasectomy; and Total hip arthroplasty (Left, 2022). Social History:   reports that he has never smoked. He has never used smokeless tobacco. He reports current alcohol use. He reports that he does not use drugs. Family History: Mother  of cirrhosis of liver, non-alcoholic. Father  of COPD, heavy smoker. No cardiovascular disease history among 6 siblings. 2 children, healthy. Home Medications:  Were reviewed and are listed in nursing record and/or below  Prior to Admission medications    Medication Sig Start Date End Date Taking? Authorizing Provider   aspirin EC 81 MG EC tablet Take 1 tablet by mouth 2 times daily 22   Aliyah Mathias MD   senna-docusate (PERICOLACE) 8.6-50 MG per tablet Take 2 tablets by mouth daily as needed for Constipation 22   Aliyah Mathias MD   gabapentin (NEURONTIN) 300 MG capsule Take 300 mg by mouth 3 times daily.   22   Historical Provider, MD   escitalopram (LEXAPRO) 10 MG tablet  22   Historical Provider, MD   triamcinolone (KENALOG) 0.1 % cream Apply topically as needed eczema 22   Historical Provider, MD   meloxicam (MOBIC) 15 MG tablet  22   Historical Provider, MD   atorvastatin (LIPITOR) 10 MG tablet  3/31/22   Historical Provider, MD   Cholecalciferol (VITAMIN D3 PO) Take 1 tablet by mouth daily    Historical Provider, MD   valACYclovir (VALTREX) 1 g tablet Take 2,000 mg by mouth    Historical Provider, MD   butalbital-acetaminophen-caffeine (FIORICET) -40 MG per tablet Take 1 tablet by mouth every 6 hours as needed for Headaches  Patient not taking: Reported on 6/30/2022 12/1/16   Liliana De Los Santos, APRN - CNP   amLODIPine (NORVASC) 10 MG tablet Take 10 mg by mouth daily. Historical Provider, MD   atenolol (TENORMIN) 100 MG tablet Take 100 mg by mouth daily. Historical Provider, MD   lisinopril (PRINIVIL;ZESTRIL) 40 MG tablet Take 40 mg by mouth daily. Historical Provider, MD   tadalafil (CIALIS) 20 MG tablet Take 20 mg by mouth as needed for Erectile Dysfunction. Historical Provider, MD   Multiple Vitamin (MULTI VITAMIN DAILY PO) Take  by mouth. Patient not taking: Reported on 6/30/2022    Historical Provider, MD   acetaminophen (TYLENOL) 500 MG tablet Take 500 mg by mouth every 6 hours as needed for Pain. Historical Provider, MD        CURRENT Medications:  polyvinyl alcohol (LIQUIFILM TEARS) 1.4 % ophthalmic solution 1 drop, PRN  amLODIPine (NORVASC) tablet 10 mg, Daily  atenolol (TENORMIN) tablet 50 mg, Daily  atorvastatin (LIPITOR) tablet 10 mg, Nightly  gabapentin (NEURONTIN) capsule 300 mg, TID  lisinopril (PRINIVIL;ZESTRIL) tablet 20 mg, Daily  sennosides-docusate sodium (SENOKOT-S) 8.6-50 MG tablet 1 tablet, Daily PRN  sodium chloride flush 0.9 % injection 5-40 mL, 2 times per day  sodium chloride flush 0.9 % injection 5-40 mL, PRN  0.9 % sodium chloride infusion, PRN  polyethylene glycol (GLYCOLAX) packet 17 g, Daily PRN  acetaminophen (TYLENOL) tablet 650 mg, Q6H PRN   Or  acetaminophen (TYLENOL) suppository 650 mg, Q6H PRN  prochlorperazine (COMPAZINE) injection 10 mg, Q6H PRN  therapeutic multivitamin-minerals 1 tablet, Daily  escitalopram (LEXAPRO) tablet 10 mg, Nightly  perflutren lipid microspheres (DEFINITY) injection 1.5 mL, ONCE PRN  amoxicillin-clavulanate (AUGMENTIN) 875-125 MG per tablet 1 tablet, 2 times per day        Allergies:  Triple antibiotic [bacitracin-neomycin-polymyxin]     Review of Systems:   A 14 point review of symptoms completed.  Pertinent positives identified in the HPI, all other review of symptoms negative as below. Objective:     Vitals:    12/07/22 0524 12/07/22 0757 12/07/22 0922 12/07/22 1252   BP: (!) 148/87 125/83 126/80 126/79   Pulse: 72 75 63 69   Resp: 18 17  18   Temp: 98.8 °F (37.1 °C) 99 °F (37.2 °C)  98.8 °F (37.1 °C)   TempSrc: Oral Oral  Oral   SpO2: 93% 92% 95% 95%   Weight:       Height:          Weight: 198 lb 9.6 oz (90.1 kg)       PHYSICAL EXAM:    General:  Alert, cooperative, no distress, appears stated age   Head:  Normocephalic, atraumatic   Eyes:  Conjunctiva/corneas clear, anicteric sclerae    Nose: Nares normal, no drainage or sinus tenderness   Throat: No abnormalities of the lips, oral mucosa or tongue. Neck: Trachea midline. Neck supple with no lymphadenopathy, thyroid not enlarged, symmetric, no tenderness/mass/nodules    Lungs:   Clear to auscultation bilaterally, no wheezes, no rales, no respiratory distress   Chest Wall:  No deformity or tenderness to palpation   Heart:  Regular rate and rhythm, normal S1, normal S2, no murmur is heard, no rub, no S3/S4, PMI non-displaced. No heave. Abdomen:   Soft, non-tender, with normoactive bowel sounds. No masses, no hepatosplenomegaly   Extremities: No cyanosis, clubbing or pitting edema. Vascular: 2+ radial, 2+ dorsalis pedis and posterior tibial pulses bilaterally. Brisk carotid upstrokes without carotid bruit. Skin: Skin color, texture, turgor are normal with no rashes or ulceration. Pysch: Euthymic mood, appropriate affect   Neurologic: Oriented to person, place and time. No slurred speech or facial asymmetry. No motor or sensory deficits on gross examination.          Labs:   CBC:   Lab Results   Component Value Date/Time    WBC 9.0 12/07/2022 06:59 AM    RBC 4.45 12/07/2022 06:59 AM    HGB 13.8 12/07/2022 06:59 AM    HCT 41.2 12/07/2022 06:59 AM    MCV 92.5 12/07/2022 06:59 AM    RDW 13.6 12/07/2022 06:59 AM     12/07/2022 06:59 AM     CMP:  Lab Results   Component Value Date/Time     12/07/2022 06:59 AM    K 3.8 12/07/2022 06:59 AM     12/07/2022 06:59 AM    CO2 26 12/07/2022 06:59 AM    BUN 15 12/07/2022 06:59 AM    CREATININE 0.7 12/07/2022 06:59 AM    GFRAA >60 07/07/2022 06:38 AM    AGRATIO 2.0 12/06/2022 05:01 PM    LABGLOM >60 12/07/2022 06:59 AM    GLUCOSE 125 12/07/2022 06:59 AM    PROT 6.7 12/06/2022 05:01 PM    CALCIUM 8.7 12/07/2022 06:59 AM    BILITOT 0.5 12/06/2022 05:01 PM    ALKPHOS 63 12/06/2022 05:01 PM    AST 21 12/06/2022 05:01 PM    ALT 18 12/06/2022 05:01 PM     PT/INR:  No results found for: PTINR  HgBA1c:  Lab Results   Component Value Date    LABA1C 6.2 12/06/2022     Lab Results   Component Value Date    TROPONINI <0.01 12/07/2022       Lab Results   Component Value Date    CHOL 156 12/07/2022     Lab Results   Component Value Date    TRIG 169 (H) 12/07/2022     Lab Results   Component Value Date    HDL 41 12/07/2022     Lab Results   Component Value Date    LDLCALC 81 12/07/2022     Lab Results   Component Value Date    LABVLDL 34 12/07/2022     No results found for: [facility]     Cardiac Data:     EKG: Personally reviewed with my interpretation as above. Telemetry personally reviewed: no events. Echo: 12/7/22   Summary   Normal left ventricular size with mild concentric left ventricular   hypertrophy. Normal left ventricular systolic function with ejection fraction of 50-55%. Basal to mid inferior wall hypokinesis. Grade II diastolic dysfunction with elevated filling pressure. Normal right ventricular size and function. Bi-atrial enlargement. Mild mitral regurgitation. Moderate aortic regurgitation. No tricuspid regurgitation to estimate systolic pulmonary artery pressure   (SPAP). 8/2020  Study Conclusions     - Left ventricle: The cavity size is normal. Wall thickness is     normal. Systolic function was normal. The estimated ejection     fraction was in the range of 52% to 57%.  Doppler parameters are     consistent with abnormal left ventricular relaxation (grade 1     diastolic dysfunction). The global longitudinal strain was -18%. - Ventricular septum: Septal motion is paradoxical septal motion     consistent with a conduction abnormality or paced rhythm. - Aortic valve: There was mild to moderate regurgitation. The peak     systolic gradient is 8mm Hg. - Left atrium: The atrium is mildly dilated. - Right atrium: The atrium is mildly dilated. - Inferior vena cava: The vessel was normal in size; the     respirophasic diameter changes were in the normal range (&gt;= 50%);     findings are consistent with normal central venous pressure. 12/2018  Study Conclusions     - Left ventricle: The cavity size was normal. Wall thickness was     normal. Systolic function was normal. The calculated ejection     fraction was in the range of 52% to 57%. Doppler parameters are     consistent with abnormal left ventricular relaxation (grade 1     diastolic dysfunction). The global longitudinal strain was -23%. - Ventricular septum: Septal motion showed paradoxical septal     motion consistent with a conduction abnormality or paced rhythm. - Aortic valve: There was mild to moderate regurgitation. Peak     gradient (S): 7mm Hg. - Left atrium: The atrium was mildly dilated. - Right ventricle: The cavity size was mildly dilated. Wall     thickness was normal.   - Right atrium: The atrium was dilated. - Inferior vena cava: Poorly visualized. The vessel was normal in     size. Stress echo 2017  Study Conclusions     - Stress ECG conclusions: There were no stress arrhythmias or     conduction abnormalities. The stress ECG was negative for     ischemia. - Stress echo: Normal echo stress. Impressions:   Normal study. The target heart rate of 131bpm was   achieved. Impression and Plan:      Syncopal episode, strongly suspect orthostasis  -amlodipine continued, atenolol/lisinopril dec   -BP stable  -volume replete.      Abnormal echo with inferior hypokinesis, new finding  No angina by history   Abnormal EKG  -no angina by history with his exercise/ADL's.   -start baby aspirin daily  -inc statin to 20 mg nightly  -follow up with DR. Rashi Enrique for consideration of repeat stress test as OP (lexiscan)     Moderate aortic regurgitation, non-rheumatic   Mild mitral regurgitation  -follow clinically, no intervention at this time. Hypertension - reasonable control  Hyperlipidemia    Pt would like to defer stress testing to OP. He would like to return home to his wife today. Stable for DC from my standpoint. Patient Active Problem List   Diagnosis    Osteoarthritis of one hip, left    Syncope and collapse    Hypertension    Leukocytosis         I will address the patient's cardiac risk factors and adjusted pharmacologic treatment as needed. In addition, I have reinforced the need for patient directed risk factor modification. All questions and concerns were addressed to the patient/family. Alternatives to my treatment were discussed. Thank you for allowing us to participate in the care of Beck Guevara. Please call me with any questions [phone].     Ale Blankenship MD, [address] [facility]  [phone] [facility]  [phone] [address] [place] [address]  12/7/2022 3:14 PM

## 2022-12-07 NOTE — CARE COORDINATION
CASE MANAGEMENT INITIAL ASSESSMENT      Reviewed chart and completed assessment with patient:bedside  Family present: none  Explained Case Management role/services. Primary contact information:[place] or [facility] Decision Maker :   Primary Decision Maker: Judy Avendaño - Child - [phone]    Primary Decision Maker: Shayla Bishop Child - [phone]    Secondary Decision Maker: Fazal Pillai - Spouse - [phone]    Secondary Decision Maker: maco,cathy - Other - [phone]          Can this person be reached and be able to respond quickly, such as within a few minutes or hours? Yes    Admit date/status:12/6/22 OBS  Diagnosis:syncope and collapse   Is this a Readmission?:  No      Insurance: railroad medicare, [employer] 66 required for SNF: Yes       3 night stay required: No    Living arrangements, Adls, care needs, prior to admission: pt lives in a 2 story house, with wife. 3 MAGGIE. IPTA. Durable Medical Equipment at home:  none    Services in the home and/or outpatient, prior to admission: none    Current PCP:Nickolas Brooks MD                                Medications: Prescription coverage? Yes Will pt require financial assistance with medications No     Transportation needs: none/private. Active        PT/OT recs:home w/assist as needed    Hospital Exemption Notification (HEN): needed for SNF    Barriers to discharge: none    Plan/comments: syncope and collapse, HTN. Management per cardiology and IM. ECHO ordered. Awaiting cardiology input. Pt from home with wife. Wife has early dementia and pt provides care for her. Pt is eager to get home. Denies dcp needs. CM will follow, should needs arise.  Alana Anderson RN       ECOC on chart for MD signature

## 2022-12-07 NOTE — DISCHARGE SUMMARY
Hospital Medicine Discharge Summary    Patient ID: Marino Davies      Patient's PCP: Jettie Gottron    Admit Date: 12/6/2022     Discharge Date:   12/07/22     Admitting Provider: Elbert Schaefer MD     Discharge Provider: Brooklyn David MD     Discharge Diagnoses: Active Hospital Problems    Diagnosis     Syncope and collapse [R55]      Priority: Medium    Hypertension [I10]      Priority: Medium    Leukocytosis [D72.829]      Priority: Medium       The patient was seen and examined on day of discharge and this discharge summary is in conjunction with any daily progress note from day of discharge. Hospital Course:     Admitted after a syncope that was consistent with orthostatic hypotension. Lisinopril and atenolol were reduced. Seen by cardiology. Echocardiogram showed a regional wall abnormality. Stress test was offered but patient preferred to follow up with his own cardiologist for the stress test.    Discharged with reduced doses of antihypertensive medications. Stable condition at the time of discharge      Physical Exam Performed:     /79   Pulse 69   Temp 98.8 °F (37.1 °C) (Oral)   Resp 18   Ht 5' 9\" (1.753 m)   Wt 198 lb 9.6 oz (90.1 kg)   SpO2 95%   BMI 29.33 kg/m²       General appearance:  No apparent distress, appears stated age and cooperative. HEENT:  Normal cephalic, atraumatic without obvious deformity. Pupils equal, round, and reactive to light. Extra ocular muscles intact. Conjunctivae/corneas clear. Neck: Supple, with full range of motion. No jugular venous distention. Trachea midline. Respiratory:  Normal respiratory effort. Clear to auscultation, bilaterally without Rales/Wheezes/Rhonchi. Cardiovascular:  Regular rate and rhythm with normal S1/S2 without murmurs, rubs or gallops. Abdomen: Soft, non-tender, non-distended with normal bowel sounds. Musculoskeletal:  No clubbing, cyanosis or edema bilaterally.   Full range of motion without deformity. Skin: Skin color, texture, turgor normal.  No rashes or lesions. Neurologic:  Neurovascularly intact without any focal sensory/motor deficits. Cranial nerves: II-XII intact, grossly non-focal.  Psychiatric:  Alert and oriented, thought content appropriate, normal insight  Capillary Refill: Brisk,< 3 seconds   Peripheral Pulses: +2 palpable, equal bilaterally       Labs: For convenience and continuity at follow-up the following most recent labs are provided:      CBC:    Lab Results   Component Value Date/Time    WBC 9.0 12/07/2022 06:59 AM    HGB 13.8 12/07/2022 06:59 AM    HCT 41.2 12/07/2022 06:59 AM     12/07/2022 06:59 AM       Renal:    Lab Results   Component Value Date/Time     12/07/2022 06:59 AM    K 3.8 12/07/2022 06:59 AM     12/07/2022 06:59 AM    CO2 26 12/07/2022 06:59 AM    BUN 15 12/07/2022 06:59 AM    CREATININE 0.7 12/07/2022 06:59 AM    CALCIUM 8.7 12/07/2022 06:59 AM         Significant Diagnostic Studies    Radiology:   CT CHEST PULMONARY EMBOLISM W CONTRAST   Final Result   No evidence of pulmonary embolism or acute pulmonary abnormality. Mild   cardiomegaly. Additional nonemergent findings, as above. CT Head W/O Contrast   Final Result   No acute transcortical infarct or intracranial hemorrhage. Mild chronic   microangiopathy. XR CHEST PORTABLE   Final Result   Cardiomegaly. Focal opacity in the retrocardiac area may represent pneumonic   infiltrate. Minimal linear atelectasis.                 Consults:     IP CONSULT TO HOSPITALIST  IP CONSULT TO CARDIOLOGY    Disposition:  Home     Condition at Discharge: Stable    Discharge Instructions/Follow-up:  Dr. Narcisa Mendiola, cardiology at [facility]    Code Status:  Full Code     Activity: activity as tolerated    Diet: regular diet      Discharge Medications:     Current Discharge Medication List             Details   amoxicillin-clavulanate (AUGMENTIN) 875-125 MG per tablet Take 1 tablet by mouth every 12 hours for 7 days  Qty: 14 tablet, Refills: 0      atenolol (TENORMIN) 50 MG tablet Take 1 tablet by mouth daily  Qty: 30 tablet, Refills: 3      lisinopril (PRINIVIL;ZESTRIL) 20 MG tablet Take 1 tablet by mouth daily  Qty: 30 tablet, Refills: 3                Details   aspirin EC 81 MG EC tablet Take 1 tablet by mouth 2 times daily  Qty: 60 tablet, Refills: 0      senna-docusate (PERICOLACE) 8.6-50 MG per tablet Take 2 tablets by mouth daily as needed for Constipation  Qty: 30 tablet, Refills: 2      gabapentin (NEURONTIN) 300 MG capsule Take 300 mg by mouth 3 times daily. escitalopram (LEXAPRO) 10 MG tablet       triamcinolone (KENALOG) 0.1 % cream Apply topically as needed eczema      atorvastatin (LIPITOR) 10 MG tablet       Cholecalciferol (VITAMIN D3 PO) Take 1 tablet by mouth daily      valACYclovir (VALTREX) 1 g tablet Take 2,000 mg by mouth      amLODIPine (NORVASC) 10 MG tablet Take 10 mg by mouth daily. tadalafil (CIALIS) 20 MG tablet Take 20 mg by mouth as needed for Erectile Dysfunction. Multiple Vitamin (MULTI VITAMIN DAILY PO) Take  by mouth. acetaminophen (TYLENOL) 500 MG tablet Take 500 mg by mouth every 6 hours as needed for Pain. Time Spent on discharge: 42 minutes in the examination, evaluation, counseling and review of medications and discharge plan. Signed:    Steph Cisse MD   12/7/2022      Thank you Lilo Larson for the opportunity to be involved in this patient's care. If you have any questions or concerns, please feel free to contact me at [phone].

## 2022-12-07 NOTE — PROGRESS NOTES
Physical Therapy  Facility/Department: [place] [address] - MED SURG  Physical Therapy Initial Assessment    Name: Beck Guevara  : 1950  MRN: [id number]  Date of Service: 2022    Discharge Recommendations:  Home with assist PRN   PT Equipment Recommendations  Equipment Needed: No      Patient Diagnosis(es): The primary encounter diagnosis was Syncope and collapse. A diagnosis of Cardiomegaly was also pertinent to this visit. Past Medical History:  has a past medical history of Anxiety, Arthritis, Depression, Foot pain, right, Hx of degenerative disc disease, Hyperlipidemia, Hypertension, PONV (postoperative nausea and vomiting), and Prediabetes. Past Surgical History:  has a past surgical history that includes hernia repair; Nose surgery; Shoulder Arthroplasty; Colonoscopy (3/27/2015); Hip Arthroplasty (Right); lumbar fusion; joint replacement (Bilateral); Vasectomy; and Total hip arthroplasty (Left, 2022).     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 has mild dementia, pt progressed to ambulating with cane s/p L THR; pt presents with independence with bed mobility and transfers, supervision for gait and stair assessment, no Acute PT goals identified, reviewed previous HEP for hip strengthening, PT recommends pt return home with assist prn, no AD needs at this time  Therapy Prognosis: Excellent  Decision Making: Low Complexity  Barriers to Learning: none  Requires PT Follow-Up: No  Activity Tolerance  Activity Tolerance: Patient tolerated evaluation without incident  Activity Tolerance Comments: BP sit 135/86, 63 bpm, stand 134/77, 67 bpm ; pt asymptomatic throughout session     Plan   Physcial Therapy Plan  General Plan: Discharge with evaluation only  Safety Devices  Type of Devices: Call light within reach, Gait belt, Left in chair, Nurse notified     Restrictions  Restrictions/Precautions  Restrictions/Precautions: Seizure  Position Activity Restriction  Other position/activity restrictions: up with assist, tele     Subjective   Pain: none \"she just gave me tylenol\"  General  Chart Reviewed: Yes  Patient assessed for rehabilitation services?: Yes  Additional Pertinent Hx: h/o B THR  Response To Previous Treatment: Not applicable  Family / Caregiver Present: No  Referring Practitioner: César Carreon  Referral Date : 12/08/22  Diagnosis: syncope and collapse  Follows Commands: Within Functional Limits  General Comment  Comments: RN cleared pt for PT eval  Subjective  Subjective: pt motivated for PT eval         Social/Functional History  Social/Functional History  Lives With: Spouse  Type of Home: House  Home Layout: Bed/Bath upstairs (13 to bedroom, rail on L all the way up, R 1/2 way)  Home Access: Stairs to enter without rails  Entrance Stairs - Number of Steps: 3  Bathroom Shower/Tub: Walk-in shower  Bathroom Toilet: Standard  Bathroom Equipment: Shower chair  Home Equipment: Erica Fregoso, hanna, [employer], [address]  Has the patient had two or more falls in the past year or any fall with injury in the past year?: Yes (1 prior to admit due to syncope)  ADL Assistance: Independent  Homemaking Assistance: Independent  Ambulation Assistance: Independent (cane with bicycle horn on it)  Transfer Assistance: Independent  Active : Yes (wife can't drive)  Occupation: Retired  Type of Occupation: railroad  Vision/Hearing  Vision  Vision: Impaired  Vision Exceptions: Wears glasses at all times  Hearing  Hearing: Exceptions to [facility]  Hearing Exceptions: Hard of hearing/hearing concerns;Bilateral hearing aid (doesn't wear them due to pain and feedback)    Cognition   Orientation  Overall Orientation Status: Within Functional Limits  Orientation Level: Oriented X4  Cognition  Overall Cognitive Status: WFL     Objective   Heart Rate: 63  Heart Rate Source: Monitor  BP: 126/80  BP Location: Left upper arm  BP Method: Automatic  Patient Position: Semi fowlers  MAP (Calculated): 95  Resp: 17  SpO2: 95 %  O2 Device: None (Room air)     Observation/Palpation  Posture: Good  Body Mechanics: reviewed anterior precautions and hip joint alignment  Gross Assessment  AROM: Within functional limits  PROM: Within functional limits  Strength: Within functional limits  Coordination: Within functional limits  Tone: Normal  Sensation: Intact                    Bed mobility  Rolling to Left: Independent  Rolling to Right: Independent  Supine to Sit: Independent  Sit to Supine: Independent  Scooting: Independent  Transfers  Sit to Stand: Independent  Stand to Sit: Independent  Bed to Chair: Independent  Ambulation  Surface: Level tile  Device: No Device (IV pole at times for convenience not for support)  Assistance: Supervision  Quality of Gait: antalgic, B lateral sway  Gait Deviations: Slow Lily; Increased NICHELLE  Distance: 225ft  Comments: pt endorses discomfort L hip during gait assess, not limiting  Stairs/Curb  Stairs?: Yes  Stairs  # Steps : 6  Stairs Height: 6\"  Rails: Right ascending  Assistance: Supervision     Balance  Posture: Good  Sitting - Static: Good  Sitting - Dynamic: Good  Standing - Static: Good  Standing - Dynamic: Good  Exercise Treatment: seated glute sets x 10 reps, supine quad sets x 10 reps, reviewed HEP from previous PT sessions, instructed pt to focus on glute sets, ab sets, and quad sets for stabilization of L hip post dislocation          AM-PAC Score  AM-PAC Inpatient Mobility Raw Score : 24 (12/07/22 1007)  AM-PAC Inpatient T-Scale Score : 61.14 (12/07/22 1007)  Mobility Inpatient CMS 0-100% Score: 0 (12/07/22 1007)  Mobility Inpatient CMS G-Code Modifier : [address] (12/07/22 1007)            Goals  Short Term Goals  Time Frame for Short Term Goals: one visit  Short Term Goal 1: pt will be independent with bed mobility - MET  Short Term Goal 2: pt will transfer independently - MET  Short Term Goal 3: pt will ambulate 150ft with RW with SUP - MET  Short Term Goal 4: pt will negotiate 3 steps with one rail with SUP to enter home - MET  Patient Goals   Patient Goals : walk down the [place] - MET       Education  Patient Education  Education Given To: Patient  Education Provided: Role of Therapy;Plan of Care;Home Exercise Program;Precautions; ADL Adaptive Strategies;Transfer Training;Equipment; Fall Prevention Strategies  Education Method: Demonstration;Verbal  Barriers to Learning: None  Education Outcome: Verbalized understanding;Demonstrated understanding      Therapy Time   Individual Concurrent Group Co-treatment   Time In 0901         Time Out 0954         Minutes 53         Timed Code Treatment Minutes: 45 Minutes       Sav Hernandez, PT

## 2022-12-22 ENCOUNTER — HOSPITAL ENCOUNTER (OUTPATIENT)
Dept: CT IMAGING | Age: 72
Discharge: HOME | End: 2022-12-22
Payer: MEDICARE

## 2022-12-22 DIAGNOSIS — M25.552 LEFT HIP PAIN: ICD-10-CM

## 2022-12-22 PROCEDURE — 73700 CT LOWER EXTREMITY W/O DYE: CPT

## 2023-03-25 ENCOUNTER — HOSPITAL ENCOUNTER (EMERGENCY)
Age: 73
Discharge: HOME | End: 2023-03-25
Payer: MEDICARE

## 2023-03-25 ENCOUNTER — APPOINTMENT (OUTPATIENT)
Dept: GENERAL RADIOLOGY | Age: 73
End: 2023-03-25
Payer: MEDICARE

## 2023-03-25 VITALS
HEART RATE: 74 BPM | WEIGHT: 197 LBS | RESPIRATION RATE: 18 BRPM | HEIGHT: 69 IN | DIASTOLIC BLOOD PRESSURE: 87 MMHG | TEMPERATURE: 98.2 F | OXYGEN SATURATION: 96 % | BODY MASS INDEX: 29.18 KG/M2 | SYSTOLIC BLOOD PRESSURE: 148 MMHG

## 2023-03-25 DIAGNOSIS — S80.12XA CONTUSION OF LEFT LOWER EXTREMITY, INITIAL ENCOUNTER: Primary | ICD-10-CM

## 2023-03-25 PROCEDURE — 99283 EMERGENCY DEPT VISIT LOW MDM: CPT

## 2023-03-25 PROCEDURE — 73590 X-RAY EXAM OF LOWER LEG: CPT

## 2023-03-25 ASSESSMENT — PAIN SCALES - GENERAL: PAINLEVEL_OUTOF10: 4

## 2023-03-25 ASSESSMENT — PAIN DESCRIPTION - LOCATION: LOCATION: LEG

## 2023-03-25 ASSESSMENT — PAIN DESCRIPTION - ORIENTATION: ORIENTATION: LEFT;LOWER

## 2023-03-25 ASSESSMENT — PAIN - FUNCTIONAL ASSESSMENT: PAIN_FUNCTIONAL_ASSESSMENT: 0-10

## 2023-03-25 NOTE — ED PROVIDER NOTES
[facility]  ED  eMERGENCY dEPARTMENT eNCOUnter        Pt Name: Halina Zhong  MRN: [id number]  [place] 1950  Date of evaluation: 3/25/2023  Provider: Kathy Wright PA-C  PCP: [place] Shane  ED Attending: Doc Jaquez MD    SUREKHA patient. This patient was not seen by the ED attending, though they were available to consult. History is provided by the patient. No limitations. CHIEF COMPLAINT:  Leg Pain (Patient was putting his grandson in the back seat approximately two hours ago and the wind caught the door and slammed his left lower leg between the door and the car. Has a contusion on the top of shin below the knee. He's concerned for blood clots.)      HISTORY OF PRESENT ILLNESS:  Halina Zhong is a 71 y.o. male who presents to the ED via private vehicle with complaints of leg injury. Patient states he was attempting to strap his 3year-old grandson in the backseat of the car just a couple of hours before coming in. He was reaching into the car when the car door was blown shot onto his left lower leg. He has a contusion to the anterior aspect of his proximal tibia. Pain is mild to moderate, rated 4/10. The patient is here because he is concerned about developing a blood clot from this injury. No history of DVT/PE in the past.  Takes aspirin, but no other anticoagulation. No other complaints, modifying factors or associated symptoms. Nursing notes reviewed.    Past Medical History:   Diagnosis Date    Anxiety     Arthritis     Depression     Foot pain, right     \"collapsed\" surgery to be done after this surgery    Hx of degenerative disc disease     Hyperlipidemia     Hypertension     PONV (postoperative nausea and vomiting)     Prediabetes      Past Surgical History:   Procedure Laterality Date    COLONOSCOPY  3/27/2015    HERNIA REPAIR      HIP ARTHROPLASTY Right     JOINT REPLACEMENT Bilateral     knee    LUMBAR FUSION      NOSE SURGERY      SHOULDER ARTHROPLASTY      right x 2 and left x1    TOTAL HIP ARTHROPLASTY Left 7/6/2022    LEFT TOTAL HIP ARTHROPLASTY ANTERIOR APPROACH performed by Deedee Ritter MD at [address] [place] [address]       History reviewed. No pertinent family history. Social History     Socioeconomic History    Marital status:      Spouse name: Not on file    Number of children: Not on file    Years of education: Not on file    Highest education level: Not on file   Occupational History    Not on file   Tobacco Use    Smoking status: Never    Smokeless tobacco: Never   Vaping Use    Vaping Use: Never used   Substance and Sexual Activity    Alcohol use: Yes     Comment: beer 1-2 day & Mobile weekly but none in month ([place])    Drug use: Never    Sexual activity: Not on file   Other Topics Concern    Not on file   Social History Narrative    Not on file     Social Determinants of Health     Financial Resource Strain: Not on file   Food Insecurity: Not on file   Transportation Needs: Not on file   Physical Activity: Not on file   Stress: Not on file   Social Connections: Not on file   Intimate Partner Violence: Not on file   Housing Stability: Not on file     No current facility-administered medications for this encounter. Current Outpatient Medications   Medication Sig Dispense Refill    atenolol (TENORMIN) 50 MG tablet Take 1 tablet by mouth daily 30 tablet 3    aspirin EC 81 MG EC tablet Take 1 tablet by mouth 2 times daily 60 tablet 0    senna-docusate (PERICOLACE) 8.6-50 MG per tablet Take 2 tablets by mouth daily as needed for Constipation 30 tablet 2    gabapentin (NEURONTIN) 300 MG capsule Take 300 mg by mouth 3 times daily.        escitalopram (LEXAPRO) 10 MG tablet       triamcinolone (KENALOG) 0.1 % cream Apply topically as needed eczema      atorvastatin (LIPITOR) 10 MG tablet       Cholecalciferol (VITAMIN D3 PO) Take 1 tablet by mouth daily      valACYclovir (VALTREX) 1 g tablet Take 2,000 mg by mouth      amLODIPine (NORVASC) 10 MG tablet

## (undated) DEVICE — SOLUTION IRRIG 3000ML 0.9% SOD CHL USP UROMATIC PLAS CONT

## (undated) DEVICE — SUTURE MCRYL SZ 3-0 L27IN ABSRB UD L19MM PS-2 3/8 CIR PRIM Y427H

## (undated) DEVICE — SUTURE ETHBND EXCEL SZ 5 L30IN NONABSORBABLE GRN L40MM V-37 MB66G

## (undated) DEVICE — GARMENT,MEDLINE,DVT,INT,CALF,MED, GEN2: Brand: MEDLINE

## (undated) DEVICE — GOWN,SIRUS,POLYRNF,BRTHSLV,XL,30/CS: Brand: MEDLINE

## (undated) DEVICE — 3M™ STERI-DRAPE™ INSTRUMENT POUCH 1018: Brand: STERI-DRAPE™

## (undated) DEVICE — 6619 2 PTNT ISO SYS INCISE AREA&LT;(&GT;&&LT;)&GT;P: Brand: STERI-DRAPE™ IOBAN™ 2

## (undated) DEVICE — SYRINGE MED 30ML STD CLR PLAS LUERLOCK TIP N CTRL DISP

## (undated) DEVICE — SUTURE VCRL SZ 0 L27IN ABSRB UD L36MM CT-1 1/2 CIR J260H

## (undated) DEVICE — BIPOLAR SEALER 23-112-1 AQM 6.0: Brand: AQUAMANTYS ®

## (undated) DEVICE — APPLICATOR MEDICATED 26 CC SOLUTION HI LT ORNG CHLORAPREP

## (undated) DEVICE — SPONGE GZ W4XL4IN COT 12 PLY TYP VII WVN C FLD DSGN

## (undated) DEVICE — SYRINGE, LUER LOCK, 60ML: Brand: MEDLINE

## (undated) DEVICE — MARKER SURG SKIN UTIL BLK REG TIP NONSMEARING W/ 6IN RUL

## (undated) DEVICE — SUTURE VCRL SZ 2-0 L18IN ABSRB UD CT-1 L36MM 1/2 CIR J839D

## (undated) DEVICE — INTENDED FOR TISSUE SEPARATION, AND OTHER PROCEDURES THAT REQUIRE A SHARP SURGICAL BLADE TO PUNCTURE OR CUT.: Brand: BARD-PARKER ® CARBON RIB-BACK BLADES

## (undated) DEVICE — SOLUTION IV IRRIG WATER 1000ML POUR BRL 2F7114

## (undated) DEVICE — HOLDER SCALP PLAS G STD

## (undated) DEVICE — ANTERIOR TOTAL HIP: Brand: MEDLINE INDUSTRIES, INC.

## (undated) DEVICE — PEEL-AWAY HOOD: Brand: FLYTE, SURGICOOL

## (undated) DEVICE — NEEDLE SUT SZ 3 MAYO 1 2 CIR TAPR PNT DISPOSABLE

## (undated) DEVICE — UNDERGLOVE SURG SZ 8 BLU LTX FREE SYN POLYISOPRENE POLYMER

## (undated) DEVICE — GLOVE ORTHO 7 1/2   MSG9475

## (undated) DEVICE — Device

## (undated) DEVICE — PROTECTOR ULN NRV PUR FOAM HK LOOP STRP ANATOMICALLY

## (undated) DEVICE — TOWEL,STOP FLAG GOLD N-W: Brand: MEDLINE